# Patient Record
Sex: MALE | Race: WHITE | NOT HISPANIC OR LATINO | Employment: OTHER | URBAN - METROPOLITAN AREA
[De-identification: names, ages, dates, MRNs, and addresses within clinical notes are randomized per-mention and may not be internally consistent; named-entity substitution may affect disease eponyms.]

---

## 2021-10-21 ENCOUNTER — HOSPITAL ENCOUNTER (INPATIENT)
Facility: CLINIC | Age: 81
LOS: 7 days | Discharge: HOME HEALTH | DRG: 726 | End: 2021-10-29
Attending: EMERGENCY MEDICINE | Admitting: HOSPITALIST
Payer: MEDICARE

## 2021-10-21 DIAGNOSIS — G93.41 ACUTE METABOLIC ENCEPHALOPATHY: Primary | ICD-10-CM

## 2021-10-21 DIAGNOSIS — R31.0 GROSS HEMATURIA: ICD-10-CM

## 2021-10-21 DIAGNOSIS — M62.81 GENERALIZED MUSCLE WEAKNESS: ICD-10-CM

## 2021-10-21 LAB
ALBUMIN SERPL-MCNC: 3.9 G/DL (ref 3.4–5)
ALBUMIN UR-MCNC: 200 MG/DL
ALP SERPL-CCNC: 74 U/L (ref 40–150)
ALT SERPL W P-5'-P-CCNC: 12 U/L (ref 0–70)
ANION GAP SERPL CALCULATED.3IONS-SCNC: 9 MMOL/L (ref 3–14)
APPEARANCE UR: ABNORMAL
AST SERPL W P-5'-P-CCNC: 10 U/L (ref 0–45)
BASOPHILS # BLD AUTO: 0 10E3/UL (ref 0–0.2)
BASOPHILS NFR BLD AUTO: 0 %
BILIRUB SERPL-MCNC: 0.6 MG/DL (ref 0.2–1.3)
BILIRUB UR QL STRIP: NEGATIVE
BUN SERPL-MCNC: 19 MG/DL (ref 7–30)
CALCIUM SERPL-MCNC: 8.9 MG/DL (ref 8.5–10.1)
CHLORIDE BLD-SCNC: 104 MMOL/L (ref 94–109)
CO2 SERPL-SCNC: 24 MMOL/L (ref 20–32)
COLOR UR AUTO: ABNORMAL
CREAT SERPL-MCNC: 0.86 MG/DL (ref 0.66–1.25)
EOSINOPHIL # BLD AUTO: 0.1 10E3/UL (ref 0–0.7)
EOSINOPHIL NFR BLD AUTO: 2 %
ERYTHROCYTE [DISTWIDTH] IN BLOOD BY AUTOMATED COUNT: 15.8 % (ref 10–15)
GFR SERPL CREATININE-BSD FRML MDRD: 80 ML/MIN/1.73M2
GLUCOSE BLD-MCNC: 199 MG/DL (ref 70–99)
GLUCOSE UR STRIP-MCNC: 500 MG/DL
HCT VFR BLD AUTO: 34.5 % (ref 40–53)
HGB BLD-MCNC: 11 G/DL (ref 13.3–17.7)
HGB UR QL STRIP: ABNORMAL
IMM GRANULOCYTES # BLD: 0 10E3/UL
IMM GRANULOCYTES NFR BLD: 0 %
KETONES UR STRIP-MCNC: NEGATIVE MG/DL
LEUKOCYTE ESTERASE UR QL STRIP: NEGATIVE
LYMPHOCYTES # BLD AUTO: 1.9 10E3/UL (ref 0.8–5.3)
LYMPHOCYTES NFR BLD AUTO: 26 %
MCH RBC QN AUTO: 26.3 PG (ref 26.5–33)
MCHC RBC AUTO-ENTMCNC: 31.9 G/DL (ref 31.5–36.5)
MCV RBC AUTO: 82 FL (ref 78–100)
MONOCYTES # BLD AUTO: 0.4 10E3/UL (ref 0–1.3)
MONOCYTES NFR BLD AUTO: 6 %
NEUTROPHILS # BLD AUTO: 4.7 10E3/UL (ref 1.6–8.3)
NEUTROPHILS NFR BLD AUTO: 66 %
NITRATE UR QL: NEGATIVE
NRBC # BLD AUTO: 0 10E3/UL
NRBC BLD AUTO-RTO: 0 /100
PH UR STRIP: 6.5 [PH] (ref 5–7)
PLATELET # BLD AUTO: 233 10E3/UL (ref 150–450)
POTASSIUM BLD-SCNC: 3.6 MMOL/L (ref 3.4–5.3)
PROT SERPL-MCNC: 7.4 G/DL (ref 6.8–8.8)
RBC # BLD AUTO: 4.19 10E6/UL (ref 4.4–5.9)
RBC URINE: >182 /HPF
SARS-COV-2 RNA RESP QL NAA+PROBE: NEGATIVE
SODIUM SERPL-SCNC: 137 MMOL/L (ref 133–144)
SP GR UR STRIP: 1.03 (ref 1–1.03)
UROBILINOGEN UR STRIP-MCNC: NORMAL MG/DL
WBC # BLD AUTO: 7.2 10E3/UL (ref 4–11)
WBC URINE: 57 /HPF

## 2021-10-21 PROCEDURE — 84295 ASSAY OF SERUM SODIUM: CPT | Performed by: EMERGENCY MEDICINE

## 2021-10-21 PROCEDURE — 80053 COMPREHEN METABOLIC PANEL: CPT | Performed by: EMERGENCY MEDICINE

## 2021-10-21 PROCEDURE — 99285 EMERGENCY DEPT VISIT HI MDM: CPT | Mod: 25

## 2021-10-21 PROCEDURE — G0378 HOSPITAL OBSERVATION PER HR: HCPCS

## 2021-10-21 PROCEDURE — 250N000013 HC RX MED GY IP 250 OP 250 PS 637: Performed by: HOSPITALIST

## 2021-10-21 PROCEDURE — 87635 SARS-COV-2 COVID-19 AMP PRB: CPT | Performed by: EMERGENCY MEDICINE

## 2021-10-21 PROCEDURE — 82374 ASSAY BLOOD CARBON DIOXIDE: CPT | Performed by: EMERGENCY MEDICINE

## 2021-10-21 PROCEDURE — 250N000013 HC RX MED GY IP 250 OP 250 PS 637: Performed by: EMERGENCY MEDICINE

## 2021-10-21 PROCEDURE — 85025 COMPLETE CBC W/AUTO DIFF WBC: CPT | Performed by: EMERGENCY MEDICINE

## 2021-10-21 PROCEDURE — 99220 PR INITIAL OBSERVATION CARE,LEVEL III: CPT | Performed by: HOSPITALIST

## 2021-10-21 PROCEDURE — 36415 COLL VENOUS BLD VENIPUNCTURE: CPT | Performed by: EMERGENCY MEDICINE

## 2021-10-21 PROCEDURE — 81001 URINALYSIS AUTO W/SCOPE: CPT | Performed by: EMERGENCY MEDICINE

## 2021-10-21 PROCEDURE — C9803 HOPD COVID-19 SPEC COLLECT: HCPCS

## 2021-10-21 PROCEDURE — 87086 URINE CULTURE/COLONY COUNT: CPT | Performed by: EMERGENCY MEDICINE

## 2021-10-21 RX ORDER — ONDANSETRON 2 MG/ML
4 INJECTION INTRAMUSCULAR; INTRAVENOUS EVERY 6 HOURS PRN
Status: DISCONTINUED | OUTPATIENT
Start: 2021-10-21 | End: 2021-10-29 | Stop reason: HOSPADM

## 2021-10-21 RX ORDER — CARBIDOPA AND LEVODOPA 25; 100 MG/1; MG/1
1 TABLET ORAL ONCE
Status: COMPLETED | OUTPATIENT
Start: 2021-10-21 | End: 2021-10-21

## 2021-10-21 RX ORDER — DOCUSATE SODIUM 100 MG/1
100 CAPSULE, LIQUID FILLED ORAL DAILY PRN
Status: DISCONTINUED | OUTPATIENT
Start: 2021-10-21 | End: 2021-10-29 | Stop reason: HOSPADM

## 2021-10-21 RX ORDER — AMOXICILLIN 250 MG
2 CAPSULE ORAL 2 TIMES DAILY PRN
Status: DISCONTINUED | OUTPATIENT
Start: 2021-10-21 | End: 2021-10-29 | Stop reason: HOSPADM

## 2021-10-21 RX ORDER — CARBIDOPA AND LEVODOPA 25; 100 MG/1; MG/1
1 TABLET ORAL 3 TIMES DAILY
COMMUNITY

## 2021-10-21 RX ORDER — AMOXICILLIN 250 MG
1 CAPSULE ORAL 2 TIMES DAILY PRN
Status: DISCONTINUED | OUTPATIENT
Start: 2021-10-21 | End: 2021-10-29 | Stop reason: HOSPADM

## 2021-10-21 RX ORDER — QUETIAPINE FUMARATE 25 MG/1
25 TABLET, FILM COATED ORAL 2 TIMES DAILY
Status: DISCONTINUED | OUTPATIENT
Start: 2021-10-21 | End: 2021-10-24

## 2021-10-21 RX ORDER — OLANZAPINE 5 MG/1
5 TABLET, ORALLY DISINTEGRATING ORAL ONCE
Status: COMPLETED | OUTPATIENT
Start: 2021-10-21 | End: 2021-10-21

## 2021-10-21 RX ORDER — LANOLIN ALCOHOL/MO/W.PET/CERES
1 CREAM (GRAM) TOPICAL
COMMUNITY

## 2021-10-21 RX ORDER — ONDANSETRON 4 MG/1
4 TABLET, ORALLY DISINTEGRATING ORAL EVERY 6 HOURS PRN
Status: DISCONTINUED | OUTPATIENT
Start: 2021-10-21 | End: 2021-10-29 | Stop reason: HOSPADM

## 2021-10-21 RX ORDER — LISINOPRIL 20 MG/1
20 TABLET ORAL DAILY
COMMUNITY
End: 2021-10-21

## 2021-10-21 RX ORDER — CARBIDOPA AND LEVODOPA 25; 100 MG/1; MG/1
1 TABLET ORAL 3 TIMES DAILY
Status: DISCONTINUED | OUTPATIENT
Start: 2021-10-22 | End: 2021-10-29 | Stop reason: HOSPADM

## 2021-10-21 RX ORDER — LANOLIN ALCOHOL/MO/W.PET/CERES
3 CREAM (GRAM) TOPICAL
Status: DISCONTINUED | OUTPATIENT
Start: 2021-10-21 | End: 2021-10-29 | Stop reason: HOSPADM

## 2021-10-21 RX ORDER — DOCUSATE SODIUM 100 MG/1
100 CAPSULE, LIQUID FILLED ORAL DAILY PRN
COMMUNITY

## 2021-10-21 RX ADMIN — OLANZAPINE 5 MG: 5 TABLET, ORALLY DISINTEGRATING ORAL at 19:05

## 2021-10-21 RX ADMIN — CARBIDOPA AND LEVODOPA 1 TABLET: 25; 100 TABLET ORAL at 19:05

## 2021-10-21 RX ADMIN — QUETIAPINE FUMARATE 25 MG: 25 TABLET ORAL at 19:13

## 2021-10-21 RX ADMIN — MELATONIN TAB 3 MG 3 MG: 3 TAB at 21:09

## 2021-10-21 ASSESSMENT — ENCOUNTER SYMPTOMS
WEAKNESS: 1
HEMATURIA: 1
CONSTIPATION: 0
APPETITE CHANGE: 1

## 2021-10-21 NOTE — PHARMACY-ADMISSION MEDICATION HISTORY
Pharmacy Medication History  Admission medication history interview status for the 10/21/2021 admission is complete. See EPIC admission navigator for prior to admission medications.    Location of Interview: Patient room  Medication history sources: Patient's family/friend (wife) and Surescripts    Significant changes made to the medication list:  Removed: lisinopril and magnesium  Added: melatonin, docusate and supplement    In the past week, patient estimated taking medication this percent of the time: greater than 90%    Additional medication history information:   None    Medication reconciliation completed by provider prior to medication history? Yes    Time spent in this activity: 15 minutes    Prior to Admission medications    Medication Sig Last Dose Taking? Auth Provider   carbidopa-levodopa (SINEMET)  MG tablet Take 1 tablet by mouth 3 times daily At 11 am, 3 pm, 7 pm 10/21/2021 at 4 PM Yes Unknown, Entered By History   docusate sodium (COLACE) 100 MG capsule Take 100 mg by mouth daily as needed for constipation Past Week at Unknown time Yes Unknown, Entered By History   melatonin 3 MG tablet Take 1 mg by mouth nightly as needed for sleep 10/20/2021 at Unknown time Yes Unknown, Entered By History   UNABLE TO FIND MEDICATION NAME: Neutracuticals for brain and immune system health 10/21/2021 at Unknown time Yes Unknown, Entered By History   Vitamin D3 (CHOLECALCIFEROL) 125 MCG (5000 UT) tablet Take 1 tablet by mouth daily 10/21/2021 at Unknown time Yes Unknown, Entered By History       The information provided in this note is only as accurate as the sources available at the time of update(s)

## 2021-10-21 NOTE — H&P
[facility]    History and Physical - Hospitalist Service       Date of Admission:  10/21/2021    Assessment & Plan      Bismark Marrero is a 81 year old male with HTN, history of prostate ca, chronic recurrent hematuria, parkinson's disease with dementia admitted on 10/21/2021 for gross hematuria.    Magdiel hematuria with clots  Acute blood loss anemia, mild  Hgb relatively stable from previous. Hemodynamically stable. Hematuria started on Sunday but patient passed a large clot with a large amount of urine this morning.  - admit to observation  - await recommendations from urology - they have been paged by ED. Tentative plan is cystoscopy tomorrow. They will need to respond more urgently if patient is unable to pass urine due to clots. Per wife he passed a large clot this morning and c/o pain with passage of clots.  - check Hgb Q6h for now  - hold any anti coagulation and/or anti hypertensive given bleeding  - consider starting IV Ceftriaxone for any evidence of infection. UA showing blood.    Urinary incontinence: wears depends at night    Parkinson's disease  Dementia with behavioral disturbance: on PTA sinemet. There was some question of Lewy body dementia. No current follow up with neurology as patient's wife did not like follow up at [facility]. She gives sinemet intermittently and doesn't notice change with giving or not giving medication. Patient has been sundowning at home and wife provides all cares with assistance from son who lives nearby.  - continue sinemet TID for now  - start seroquel BID and patient will likely need sitter if family not present    Generalized weakness  Bedbound  Malnutrition  Severely debilitated since hospitalization with COVID in April. He spent 3 months in a TCU/LTC facility but wife said she brought him home because he was put on the dementia unit and she felt care would be better at home. Since he has come home he has had progressing dementia and  "behavioral issues including sundowning. Wife has not talked to anyone about these issues and had many questions about what is \"normal.\" She does seem somewhat overwhelmed with cares. Patient needs assistance to ambulate, get up from chair. He spends most time sitting in a chair. Very agitated and confused in the ED. We did discuss medication for delirium and sundowning and patient's wife was okay with trying something.  - zyprexa 5mg now. Patient will need sitter for wife to leave. Discussed with ED provider.  - seroquel 25mg po BID scheduled - hold for sedation, or reduce am dose if patient is sedated tonight.  - PT/OT  - nutrition consult  - fall precautions  - care mgmt/SW as it sounds like home situation is becoming difficult and family needs additional care. Wife mentioned that she hired help at one point but patient has some loss of inhibition with dementia and made sexual comments that are \"not him at all\" and the person quit after second visit.  - consider neurology consult or patient will at least need follow up appointment in the near future    COVID 19 PCR screen pending. Recovered COVID 4/21.     Diet:   Regular  DVT Prophylaxis: Pneumatic Compression Devices  Brar Catheter: Not present  Central Lines: None  Code Status:   Full code - patient is not able to make this decision. Wife spoke with son over phone as we discussed this and they are adamantly full code.    Clinically Significant Risk Factors Present on Admission                   Disposition Plan   Expected discharge: TBD based on urology plan recommended to prior living arrangement vs higher level of care once hemoglobin stable and urology evaluation and possible treatment complete.     The patient's care was discussed with the Attending Physician, Dr. Jurado and Dr López in ED, Bedside Nurse, Patient and Patient's Family.    Joaquina Garner MD  [facility]  Securely message with the Vocera Web Console (learn " more here)  Text page via [facility] Paging/Directory      ______________________________________________________________________    Chief Complaint   Hematuria    History is obtained from the patient's wife    History of Present Illness   Bismark Marrero is a 81 year old male who has a history of hematuria. Records are unfortunately not available regarding previous episodes. Hematuria started on Sunday but today patient was passing large clots and complaining of pain so wife brought him to ED. Patient has been living at home with wife. Functional status has declined significantly in the last 6 months and it sounds like family will need more assistance than they currently have.    Review of Systems    The 10 point Review of Systems is negative other than noted in the HPI or here.     Past Medical History    I have reviewed this patient's medical history and updated it with pertinent information if needed.     Gangrene (HCC)     HTN (hypertension)     Parkinson disease (HCC)     Past Surgical History   I have reviewed this patient's surgical history and updated it with pertinent information if needed.  FOOT/TOES SURGERY AMPUTATION     Social History   I have reviewed this patient's social history and updated it with pertinent information if needed.  Former smoker. ,    Family History     Father: lung cancer and hypertension   Mother: hypertension and heart diease   Sister: hypertension       Prior to Admission Medications   Prior to Admission Medications   Prescriptions Last Dose Informant Patient Reported? Taking?   Vitamin D3 (CHOLECALCIFEROL) 125 MCG (5000 UT) tablet   Yes Yes   Sig: Take 1 tablet by mouth daily   carbidopa-levodopa (SINEMET)  MG tablet   Yes Yes   Sig: Take 1 tablet by mouth 3 times daily   lisinopril (ZESTRIL) 20 MG tablet   Yes Yes   Sig: Take 20 mg by mouth daily   magnesium oxide 200 MG TABS   Yes Yes   Sig: Take 400 mg by mouth daily      Facility-Administered Medications: None      Allergies   No Known Allergies    Physical Exam   Vital Signs: Temp: 97.8  F (36.6  C) Temp src: Oral BP: (!) 159/71 Pulse: 94   Resp: 18 SpO2: 98 %      Weight: 0 lbs 0 oz        Data   Data reviewed today: I reviewed all medications, new labs and imaging results over the last 24 hours. I personally reviewed no images or EKG's today.    Recent Labs   Lab 10/21/21  1410   WBC 7.2   HGB 11.0*   MCV 82         POTASSIUM 3.6   CHLORIDE 104   CO2 24   BUN 19   CR 0.86   ANIONGAP 9   UBALDO 8.9   *   ALBUMIN 3.9   PROTTOTAL 7.4   BILITOTAL 0.6   ALKPHOS 74   ALT 12   AST 10     No results found for this or any previous visit (from the past 24 hour(s)).

## 2021-10-21 NOTE — ED TRIAGE NOTES
Augustin of covid in April.  Presents today complaining of blood in his urine and bleeding from the penis.  Also has become progressively week over the past few days.

## 2021-10-21 NOTE — ED PROVIDER NOTES
History   Chief Complaint:  Hematuria and Generalized Weakness    The history is provided by the patient and the spouse.      Bismark Marrero is a 81 year old male with history of Parkinson's disease, prostate cancer, Schwannoma, and COVID-19 who presents with hematuria and generalized weakness. The patient's wife reports that for the past five days, he has been bleeding through his urethra. This has happened before, most recently two years ago, at which time he was told it was due to an enlarged prostate with a blood vessel that should be cauterized. He comes to the ED because this morning he bled so much that it soaked through his Depends. Wife says that he is normally constipated, but had a bowel movement today. Has not been drinking much fluids lately.     Review of Systems   Constitutional: Positive for appetite change (decreased fluids).   Gastrointestinal: Negative for constipation.   Genitourinary: Positive for hematuria.   Neurological: Positive for weakness (generalized).     10 systems reviewed and negative except as above and in HPI.    Allergies:  Naproxen     Medications:  Sinemet  Zestril  Magnesium oxide    Past Medical History:    Delirium  Ulcer of toe of right foot  Cellulitis of right foot  Acute encephalopathy  DJD  Peripheral vascular disease  Parkinson's disease  Thoracic kyphosis  Prostate cancer   Hypertension  Schwannoma  Tremor  Tachycardia  Osteoarthritis of lumbar spine  GERD  Onychomycosis  Hearing loss  Right inguinal hernia   Acute urinary retention  COVID-19   Gangrene     Past Surgical History:    Right toe amputation   Prostate biopsies  Tonsillectomy  Cystoscopy  Colonoscopy  Scan-bone densitometry DEXA     Family History:    Brother: bladder cancer, non-hodgkin's lymphoma  Father: lung cancer, hypertension, pneumonia  Mother: CHF, hypertension  Sister: hypertension    Social History:  The patient presents to the ED with his wife.     Physical Exam     Patient Vitals for the past  24 hrs:   BP Temp Temp src Pulse Resp SpO2   10/21/21 1403 (!) 159/71 97.8  F (36.6  C) Oral 94 18 98 %       Physical Exam  General: Resting on the gurney, appears uncomfortable  Head:  The scalp, face, and head appear normal  Mouth/Throat: Mucus membranes are moist  CV:  Regular rate    Normal S1 and S2  No pathological murmur   Resp:  Breath sounds clear and equal bilaterally    Non-labored, no retractions or accessory muscle use    No coarseness    No wheezing   GI:  Abdomen is soft and non-tender. No guarding. No rebound.   :  No active bleeding at the urethral meatus.   MS:  Normal motor assessment of all extremities.    Good capillary refill noted.  Skin:  No rash or lesions noted. Slightly pale   Neuro:   Speech is normal and fluent. No apparent deficit.  Psych: Awake. Alert.  Normal affect.      Appropriate interactions.    Emergency Department Course     Laboratory:  CBC: WBC 7.2, HGB 11.0(L),      CMP: glucose: 199(H) o/w WNL (Creatinine 0.86)     UA with microscopic: color: red, appearance: cloudy, glucose: 500, blood: large, protein albumin: 200, RBC: >182(H), WBC: 57(H) o/w WNL    Asymptomatic COVID19 Virus PCR by nasopharyngeal swab: PENDING     Urine Culture: PENDING    Emergency Department Course:    Reviewed:  I reviewed nursing notes, vitals, past medical history and care everywhere    Assessments:  1655 I obtained history and examined the patient as noted above.   1814 I rechecked the patient and explained findings.     Consults:   1810 I spoke with Dr. Garner, hospitalist, regarding the patient, who agreed to admit the patient.   1817 I spoke with Dr. Porter, urology, regarding the patient.     Disposition:  The patient was admitted to the hospital under the care of Dr. Garner.     Impression & Plan     Medical Decision Making:  Bismark Marrero is a 81 year old male presents with grossly bloody urine. This became severe today.  There is no pain with these symptoms and he has  had these  symptoms before, most recently two years ago. History of prostate cancer.  Workup here is significant for ongoing gross hematuria.  There are no signs of UTI.   Consulted with urology who recommended admission with likely scope tomorrow.  They would like his hemoglobin trended.  As long as he is able to urinate stated a catheter does not need to be placed, however, should he have difficulty with urination a three-way catheter for continuous irrigation should be placed. Will admit to hositalist.     Covid-19  Bismark Marrero was evaluated during a global COVID-19 pandemic, which necessitated consideration that the patient might be at risk for infection with the SARS-CoV-2 virus that causes COVID-19.   Applicable protocols for evaluation were followed during the patient's care.   COVID-19 was considered as part of the patient's evaluation. The plan for testing is:  a test was obtained during this visit.    Diagnosis:    ICD-10-CM    1. Gross hematuria  R31.0      Scribe Disclosure:  I, Tex Walls, am serving as a scribe at 4:41 PM on 10/21/2021 to document services personally performed by Asiya Jurado MD based on my observations and the provider's statements to me.            Asiya Jurado MD  10/21/21 [id number]

## 2021-10-22 ENCOUNTER — APPOINTMENT (OUTPATIENT)
Dept: CT IMAGING | Facility: CLINIC | Age: 81
DRG: 726 | End: 2021-10-22
Attending: PHYSICIAN ASSISTANT
Payer: MEDICARE

## 2021-10-22 LAB
ABO/RH(D): NORMAL
ANION GAP SERPL CALCULATED.3IONS-SCNC: 5 MMOL/L (ref 3–14)
ANTIBODY SCREEN: NEGATIVE
BACTERIA UR CULT: NORMAL
BUN SERPL-MCNC: 18 MG/DL (ref 7–30)
CALCIUM SERPL-MCNC: 8.8 MG/DL (ref 8.5–10.1)
CHLORIDE BLD-SCNC: 108 MMOL/L (ref 94–109)
CO2 SERPL-SCNC: 26 MMOL/L (ref 20–32)
CREAT SERPL-MCNC: 0.91 MG/DL (ref 0.66–1.25)
ERYTHROCYTE [DISTWIDTH] IN BLOOD BY AUTOMATED COUNT: 15.8 % (ref 10–15)
GFR SERPL CREATININE-BSD FRML MDRD: 78 ML/MIN/1.73M2
GLUCOSE BLD-MCNC: 94 MG/DL (ref 70–99)
HCT VFR BLD AUTO: 28.5 % (ref 40–53)
HGB BLD-MCNC: 8.9 G/DL (ref 13.3–17.7)
HGB BLD-MCNC: 9.1 G/DL (ref 13.3–17.7)
HGB BLD-MCNC: 9.1 G/DL (ref 13.3–17.7)
HGB BLD-MCNC: 9.9 G/DL (ref 13.3–17.7)
INR PPP: 1.11 (ref 0.85–1.15)
MCH RBC QN AUTO: 25.8 PG (ref 26.5–33)
MCHC RBC AUTO-ENTMCNC: 31.9 G/DL (ref 31.5–36.5)
MCV RBC AUTO: 81 FL (ref 78–100)
PLATELET # BLD AUTO: 186 10E3/UL (ref 150–450)
POTASSIUM BLD-SCNC: 3.8 MMOL/L (ref 3.4–5.3)
RBC # BLD AUTO: 3.53 10E6/UL (ref 4.4–5.9)
SODIUM SERPL-SCNC: 139 MMOL/L (ref 133–144)
SPECIMEN EXPIRATION DATE: NORMAL
WBC # BLD AUTO: 6.7 10E3/UL (ref 4–11)

## 2021-10-22 PROCEDURE — 120N000004 HC R&B MS OVERFLOW

## 2021-10-22 PROCEDURE — 85610 PROTHROMBIN TIME: CPT | Performed by: HOSPITALIST

## 2021-10-22 PROCEDURE — 99233 SBSQ HOSP IP/OBS HIGH 50: CPT | Performed by: INTERNAL MEDICINE

## 2021-10-22 PROCEDURE — 85018 HEMOGLOBIN: CPT | Performed by: INTERNAL MEDICINE

## 2021-10-22 PROCEDURE — 0T9B70Z DRAINAGE OF BLADDER WITH DRAINAGE DEVICE, VIA NATURAL OR ARTIFICIAL OPENING: ICD-10-PCS | Performed by: STUDENT IN AN ORGANIZED HEALTH CARE EDUCATION/TRAINING PROGRAM

## 2021-10-22 PROCEDURE — G0378 HOSPITAL OBSERVATION PER HR: HCPCS

## 2021-10-22 PROCEDURE — 85018 HEMOGLOBIN: CPT | Performed by: HOSPITALIST

## 2021-10-22 PROCEDURE — 85027 COMPLETE CBC AUTOMATED: CPT | Performed by: HOSPITALIST

## 2021-10-22 PROCEDURE — 80048 BASIC METABOLIC PNL TOTAL CA: CPT | Performed by: HOSPITALIST

## 2021-10-22 PROCEDURE — 86900 BLOOD TYPING SEROLOGIC ABO: CPT | Performed by: INTERNAL MEDICINE

## 2021-10-22 PROCEDURE — 250N000013 HC RX MED GY IP 250 OP 250 PS 637: Performed by: HOSPITALIST

## 2021-10-22 PROCEDURE — 36415 COLL VENOUS BLD VENIPUNCTURE: CPT | Performed by: HOSPITALIST

## 2021-10-22 PROCEDURE — 74176 CT ABD & PELVIS W/O CONTRAST: CPT | Mod: ME

## 2021-10-22 PROCEDURE — 36415 COLL VENOUS BLD VENIPUNCTURE: CPT | Performed by: INTERNAL MEDICINE

## 2021-10-22 RX ADMIN — QUETIAPINE FUMARATE 25 MG: 25 TABLET ORAL at 19:53

## 2021-10-22 RX ADMIN — CARBIDOPA AND LEVODOPA 1 TABLET: 25; 100 TABLET ORAL at 19:53

## 2021-10-22 RX ADMIN — CARBIDOPA AND LEVODOPA 1 TABLET: 25; 100 TABLET ORAL at 12:49

## 2021-10-22 RX ADMIN — QUETIAPINE FUMARATE 25 MG: 25 TABLET ORAL at 08:41

## 2021-10-22 RX ADMIN — CARBIDOPA AND LEVODOPA 1 TABLET: 25; 100 TABLET ORAL at 16:52

## 2021-10-22 ASSESSMENT — ACTIVITIES OF DAILY LIVING (ADL)
ADLS_ACUITY_SCORE: 16

## 2021-10-22 NOTE — UTILIZATION REVIEW
Admission Status; Secondary Review Determination         Under the authority of the Utilization Management Committee, the utilization review process indicated a secondary review on the above patient.  The review outcome is based on review of the medical records, discussions with staff, and applying clinical experience noted on the date of the review.        (x)      Inpatient Status Appropriate - This patient's medical care is consistent with medical management for inpatient care and reasonable inpatient medical practice.      RATIONALE FOR DETERMINATION   The patient is a 81-year-old male admitted on 10/21/2021.  He came to the emergency room to evaluate gross hematuria.  Hematuria began 5 days prior to admission.  His wife noted small clots in his depends.  Hemoglobin is gone from 11.0-9.1 since admission 24 hours prior.  CT of his pelvis and abdomen shows a dense area in his bladder along with calcification consistent with an organized blood clot.  He does have an elevated PSA.  Urology was consulted and is recommending continuous bladder irrigation and likely need for cystoscopy tomorrow and possible clot evacuation and possible fulguration.  Cystoscopy will also assist with evaluation of other possible underlying urologic conditions causing bleeding.  Based on dropping hemoglobin and need for acute diagnostic management along with treatment, inpatient status is appropriate.  Discussed with Dr. Patel who will change him to inpatient status.    The severity of illness, intensity of service provided, expected LOS and risk for adverse outcome make the care complex, high risk and appropriate for hospital admission.        The information on this document is developed by the utilization review team in order for the business office to ensure compliance.  This only denotes the appropriateness of proper admission status and does not reflect the quality of care rendered.         The definitions of Inpatient Status and  Observation Status used in making the determination above are those provided in the CMS Coverage Manual, Chapter 1 and Chapter 6, section 70.4.      Sincerely,     Ross Landis MD  Physician Advisor  Utilization Review/ Case Management  [facility].

## 2021-10-22 NOTE — PROGRESS NOTES
.Observation goals PRIOR TO DISCHARGE     -diagnostic tests and consults completed and resulted not met  -vital signs normal or at patient baseline Partially met. Still sit  -safe disposition plan has been identified not met

## 2021-10-22 NOTE — PROGRESS NOTES
[facility]    HOSPITALIST PROGRESS NOTE :   --------------------------------------------------    Date of Admission:  10/21/2021    Cumulative Summary: Bismark Marrero is a 81 year old male with HTN, history of prostate ca, chronic recurrent hematuria, parkinson's disease with dementia admitted on 10/21/2021 for gross hematuria.    Assessment & Plan     Magdiel hematuria with clots  Acute blood loss anemia, mild  Hgb relatively stable from previous. Hemodynamically stable. Hematuria started on Sunday but patient passed a large clot with a large amount of urine on the morning of admission .  Patient wad admitted for further evaluation by urology   Patient primary urologist is Dr. Saleem, last seen in 2019.  Cystoscopy at that time demonstrated large median lobe of prostate without any bladder tumors.  Patient has undergone TURP in 2016 with 41 g resected.  He has been on finasteride in the past which was discontinued sometime ago by his wife who was concerned that it was contributing to his neurological decline.  Clinic note from January 2016 with Dr. Maza notes that patient had multiple prostate biopsies in the past with Dr. Wen, but currently urology is unable to find any documentation.  PSA was up to 8.68 in October 2016.    -- Patient care was assumed this morning , seen and examined, sleepy but woke up, poor historian sec to underlying dementia   -- Hgb is checked every 6 hours , will change it to every 12 hours as it has been stable   -- will try to get the blood consent from the wife over the phone in case if patient needs transfusion , will order the conditional unit of PRBC to be transfused when Hgb is less than 7   -- Of note patient is not on any home AC and is not receiving any chemical DVT prophylaxis at this time ,as urology is recommending to hold AC  - urology recommendations are reviewed , no plan for OR today , tentatively tomorrow if continues to have symptoms   -- CT scan of  "the abdomen and pelvis is done this morning , results are reviewed , showing Hyperdense material in the urinary bladder lumen is likely clot.Curvilinear calcification in the bladder neck is either in the  urinary bladder wall or in the median lobe of the prostate gland  Consider cystoscopy to exclude underlying lesions. Marked prostatomegaly.  -- Currently patient is not on any antibiotics   -- Consider starting IV Ceftriaxone for any evidence of infection. UA showing blood.     Urinary incontinence:   -- wears depends at night  -- as above , might need ibarra if retaining , per bedside nursing , he only has 75 ml residue in the bladder.     Parkinson's disease  Dementia with behavioral disturbance: on PTA sinemet. There was some question of Lewy body dementia. No current follow up with neurology as patient's wife did not like follow up at [facility]. She gives sinemet intermittently and doesn't notice change with giving or not giving medication. Patient has been sundowning at home and wife provides all cares with assistance from son who lives nearby.    -- continue sinemet TID for now  -- start seroquel BID and patient will likely need sitter if family not present  -- will also place consult for neurology who can undergo medication dosage and administration instructions with wife      Generalized weakness  Bedbound  Malnutrition  Severely debilitated since hospitalization with COVID in April. He spent 3 months in a TCU/LTC facility but wife said she brought him home because he was put on the dementia unit and she felt care would be better at home. Since he has come home he has had progressing dementia and behavioral issues including sundowning. Wife has not talked to anyone about these issues and had many questions about what is \"normal.\" She does seem somewhat overwhelmed with cares. Patient needs assistance to ambulate, get up from chair. He spends most time sitting in a chair. Very agitated and confused in the " "ED. We did discuss medication for delirium and sundowning and patient's wife was okay with trying something.    - zyprexa 5mg now. Patient will need sitter for wife to leave. Discussed with ED provider.  - seroquel 25mg po BID scheduled - hold for sedation, or reduce am dose if patient is sedated tonight.  - PT/OT  - nutrition consult  - fall precautions  - care mgmt/SW as it sounds like home situation is becoming difficult and family needs additional care. Wife mentioned that she hired help at one point but patient has some loss of inhibition with dementia and made sexual comments that are \"not him at all\" and the person quit after second visit.Needs discussion regarding plan for discharge with wife if she would prefer to take him home again once he is ready to leave   -- Neurology is consulted      COVID 19 PCR came back negative . Recovered COVID 4/21.    Clinically Significant Risk Factors Present on Admission                Diet: Regular Diet Adult    Brar Catheter: Not present  DVT Prophylaxis: Pneumatic Compression Devices, AC on hold   Code Status: Full Code    The patient's care was discussed with the Bedside Nurse and Patient.    Disposition Plan   Expected discharge:will follow up on PT and OT recommendations , currently patient is living at home with his wife but there are concerns for safe disposition planning.About 37 minutes of the time was spent on floir in patient care , more than 60 % of the time was spent in counseling and coordination of the care   Entered: Almita Starkey MD 10/22/2021, 11:03 AM     Almita Starkey MD, FACP  Text Page (7am - 6pm)    ----------------------------------------------------------------------------------------------------------------------      Interval History   Patient care was assumed this morning , seen and examined, comfortable , lying in bed , slightly sleepy but woke up easily , unable to provide any history, comfortable and denying ay any pain at this point "     -Data reviewed today: I reviewed all new labs and imaging results over the last 24 hours.    I personally reviewed no images or EKG's today.    Physical Exam   Temp: 97.5  F (36.4  C) Temp src: Axillary BP: 100/43 Pulse: 80   Resp: 18 SpO2: 100 % O2 Device: None (Room air)    Vitals:    10/22/21 0317   Weight: 76.2 kg (168 lb)     Vital Signs with Ranges  Temp:  [97.5  F (36.4  C)-97.8  F (36.6  C)] 97.5  F (36.4  C)  Pulse:  [77-94] 80  Resp:  [18] 18  BP: (100-159)/() 100/43  SpO2:  [97 %-100 %] 100 %  No intake/output data recorded.    GENERAL: Alert , awake and oriented. NAD. Conversational, appropriate.   HEENT: Normocephalic. EOMI. No icterus or injection. Nares normal.   LUNGS: Clear to auscultation. No dyspnea at rest.   HEART: Regular rate. Extremities perfused.   ABDOMEN: Soft, nontender, and nondistended. Positive bowel sounds.   EXTREMITIES: No LE edema noted.   NEUROLOGIC: Moves extremities x4 on command. No acute focal neurologic abnormalities noted.     Medications       carbidopa-levodopa  1 tablet Oral TID     QUEtiapine  25 mg Oral BID       Data   Recent Labs   Lab 10/22/21  0659 10/22/21  0009 10/21/21  1410   WBC 6.7  --  7.2   HGB 9.1*  9.1* 8.9* 11.0*   MCV 81  --  82     --  233   INR 1.11  --   --      --  137   POTASSIUM 3.8  --  3.6   CHLORIDE 108  --  104   CO2 26  --  24   BUN 18  --  19   CR 0.91  --  0.86   ANIONGAP 5  --  9   UBALDO 8.8  --  8.9   GLC 94  --  199*   ALBUMIN  --   --  3.9   PROTTOTAL  --   --  7.4   BILITOTAL  --   --  0.6   ALKPHOS  --   --  74   ALT  --   --  12   AST  --   --  10       Imaging:   Recent Results (from the past 24 hour(s))   CT Abdomen Pelvis w/o Contrast    Narrative    CT ABDOMEN PELVIS W/O CONTRAST 10/22/2021 9:20 AM    CLINICAL HISTORY: Hematuria, unknown cause  TECHNIQUE: CT scan of the abdomen and pelvis was performed without IV  contrast. Multiplanar reformats were obtained. Dose reduction  techniques were used.  CONTRAST:  None.    COMPARISON: None.    FINDINGS:   LOWER CHEST: Mild bibasilar atelectasis/scarring. Small hiatal hernia.    HEPATOBILIARY: Normal.    PANCREAS: Fatty atrophy of the pancreatic parenchyma in the head and  body.    SPLEEN: Few punctate calcified granulomas.    ADRENAL GLANDS: Normal.    KIDNEYS/BLADDER: No urinary system calculi, hydronephrosis or  perinephritic stranding. No renal masses evident on this noncontrast  CT. Hyperdense material within the urinary bladder lumen is  indeterminate (series 3, image 152), likely clot within the urinary  bladder. Curvilinear calcification in the bladder neck measuring about  3.0 x 0.9 cm (series 3, image 156), either in the urinary bladder wall  or median lobe of the prostate gland.    BOWEL: Normal caliber loops of small bowel and colon. Umbilical hernia  containing a nonobstructed segment of small bowel. Normal appendix.  Large amount of formed stool throughout the colon and rectum.    LYMPH NODES: No lymphadenopathy.    VASCULATURE: No abdominal aortic aneurysm.    PELVIC ORGANS: Marked prostatomegaly with the prostate gland measuring  7.6 x 6.3 x 9.4 cm    OTHER: No free fluid.    MUSCULOSKELETAL: Degenerative changes in the spine. No suspicious  lesions in the bones. Lumbar scoliosis.      Impression    IMPRESSION:   1.  Hyperdense material in the urinary bladder lumen is likely clot.  Consider cystoscopy to exclude underlying lesions.  2.  Curvilinear calcification in the bladder neck is either in the  urinary bladder wall or in the median lobe of the prostate gland.  3.  Marked prostatomegaly.  4.  No renal calculi, hydronephrosis or renal masses evident on  noncontrast CT.    BEBETO RODARTE MD         SYSTEM ID:  [id number]

## 2021-10-22 NOTE — CONSULTS
[facility]    Neurology Consultation     Date of Admission:  10/21/2021    Assessment & Plan   Bismark Marrero is a 81 year old male who was admitted on 10/21/2021. I was asked to see the patient for advanced Parkinson dz. patient is a 81-year-old gentleman with significant memory loss and behavioral changes as well as hallucinations.  Hallucinations started even before the Covid hospitalization.  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  It is not clear if carbidopa levodopa ever worked.  For now I would recommend the following  - Continue to treat intercurrent illness and continue carbidopa levodopa as is.  - If needed I would recommend to use Seroquel which is not typical antipsychotic for agitation and behavior problem.  I would avoid other antipsychotic or Zyprexa since people with Lewy body disease can be very sensitive to antipsychotic.  - We will continue to check metabolic work-up for memory loss  - Patient will follow-up in neurology clinic with me, to address further issues.    All of these were discussed with patient's wife I will follow-up with you tomorrow.    Yolande Flores MD    Code Status    Full Code    Reason for Consult   Reason for consult: I was asked by dr Starkey to evaluate this patient for advanced Parkinson dz    Primary Care Physician   [facility]    Chief Complaint   advanced Parkinson    History is obtained from the electronic health record    History of Present Illness   Bismark Marrero is a 81 year old male who presents with hematuria.  The patient is asleep and confused cannot give history.  The history was obtained from patient's wife who is present.  She states that last Sunday the patient started bleeding from his urethra and this started to get worse today and brought him to hospital.    Patient's wife states that the patient was diagnosed with possible Parkinson disease in 2014.  He has been on carbidopa levodopa 25/100, 3/day with  not much improvement or no changes in his situation.  Patient's wife states that family members have essential type tremor.    The patient wife states that in April of the patient got infected with Covid and was admitted to [facility] for 2 weeks.  At that time he did not have respiratory symptoms however he was very confused.  Since this hospitalization patient's wife states that the memory started to decline and the patient clinical picture started to become worse.  The patient was admitted at [facility] for 3 months in a memory care, patient's wife felt that he was not being well cared for and took him home.  Since this hospitalization patient memory became worse and worse.  He was still able to ambulate at home with the help for for walker but this has been declining.  He needed more help to stand up from a chair.  At home things were difficult to be taking care of, with the patient being at times disinhibited and agitated.  In emergency room he was also described as being agitated.    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 Taking?   UNABLE TO FIND 10/21/2021 at Unknown time  Yes Yes   Sig: MEDICATION NAME: Neutracuticals for brain and immune system health   Vitamin D3 (CHOLECALCIFEROL) 125 MCG (5000 UT) tablet 10/21/2021 at Unknown time  Yes Yes   Sig: Take 1 tablet by mouth daily   carbidopa-levodopa (SINEMET)  MG tablet 10/21/2021 at 4 PM  Yes Yes   Sig: Take 1 tablet by mouth 3 times daily At 11 am, 3 pm, 7 pm   docusate sodium (COLACE) 100 MG capsule Past Week at Unknown time  Yes Yes   Sig: Take 100 mg by mouth daily as needed for constipation   melatonin 3 MG tablet 10/20/2021 at Unknown time  Yes Yes   Sig: Take 1 mg by mouth nightly as needed for sleep       Facility-Administered Medications: None     Allergies   No Known Allergies    Social History   I have reviewed this patient's social history and updated it with pertinent information if needed. Bismark Marrero      Family History   Family members with essential type tremor    Review of Systems   The 10 point Review of Systems is negative other than noted in the HPI or here.     Physical Exam   Temp: 97.5  F (36.4  C) Temp src: Axillary BP: (!) 108/36 Pulse: 75   Resp: 18 SpO2: 94 % O2 Device: None (Room air)    Vital Signs with Ranges  Temp:  [97.5  F (36.4  C)] 97.5  F (36.4  C)  Pulse:  [75-83] 75  Resp:  [18] 18  BP: (100-154)/() 108/36  SpO2:  [94 %-100 %] 94 %  168 lbs 0 oz    The patient is asleep, he is very hard of hearing patient wife put hearing aid on the right.  He seems to be hearing better and open the eyes briefly for me however he did did not participate to the neuro exam.  He wiggles the toes.  Eyes are conjugate.  Face seems symmetric however the patient does not follow commands.  Muscular tone is increased significantly in all 4 extremities.  There is muscle wasting in all 4 extremities.  Reflexes are brisk in both knees however I cannot get any other reflexes.  Toes are equivocal.    Data   Results for orders placed or performed during the hospital encounter of 10/21/21 (from the past 24 hour(s))   UA with Microscopic reflex to Culture    Specimen: Urine, Midstream   Result Value Ref Range    Color Urine Red (A) Colorless, Straw, Light Yellow, Yellow    Appearance Urine Cloudy (A) Clear    Glucose Urine 500  (A) Negative mg/dL    Bilirubin Urine Negative Negative    Ketones Urine Negative Negative mg/dL    Specific Gravity Urine 1.033 1.003 - 1.035    Blood Urine Large (A) Negative    pH Urine 6.5 5.0 - 7.0    Protein Albumin Urine 200  (A) Negative mg/dL    Urobilinogen Urine Normal Normal, 2.0 mg/dL    Nitrite Urine Negative Negative    Leukocyte Esterase Urine Negative Negative    RBC  Urine >182 (H) <=2 /HPF    WBC Urine 57 (H) <=5 /HPF    Narrative    Urine Culture ordered based on laboratory criteria   Urine Culture    Specimen: Urine, Midstream   Result Value Ref Range    Culture No growth, less than 1 day    Asymptomatic COVID-19 Virus (Coronavirus) by PCR Nasopharyngeal    Specimen: Nasopharyngeal; Swab   Result Value Ref Range    SARS CoV2 PCR Negative Negative    Narrative    Testing was performed using the anitra  SARS-CoV-2 & Influenza A/B Assay on the anitra  Sheri  System.  This test should be ordered for the detection of SARS-COV-2 in individuals who meet SARS-CoV-2 clinical and/or epidemiological criteria. Test performance is unknown in asymptomatic patients.  This test is for in vitro diagnostic use under the FDA EUA for laboratories certified under CLIA to perform moderate and/or high complexity testing. This test has not been FDA cleared or approved.  A negative test does not rule out the presence of PCR inhibitors in the specimen or target RNA in concentration below the limit of detection for the assay. The possibility of a false negative should be considered if the patient's recent exposure or clinical presentation suggests COVID-19.  [facility] are certified under the Clinical Laboratory Improvement Amendments of 1988 (CLIA-88) as qualified to perform moderate and/or high complexity laboratory testing.   Hemoglobin   Result Value Ref Range    Hemoglobin 8.9 (L) 13.3 - 17.7 g/dL   Hemoglobin   Result Value Ref Range    Hemoglobin 9.1 (L) 13.3 - 17.7 g/dL   Basic metabolic panel   Result Value Ref Range    Sodium 139 133 - 144 mmol/L    Potassium 3.8 3.4 - 5.3 mmol/L    Chloride 108 94 - 109 mmol/L    Carbon Dioxide (CO2) 26 20 - 32 mmol/L    Anion Gap 5 3 - 14 mmol/L    Urea Nitrogen 18 7 - 30 mg/dL    Creatinine 0.91 0.66 - 1.25 mg/dL    Calcium 8.8 8.5 - 10.1 mg/dL    Glucose 94 70 - 99 mg/dL    GFR Estimate 78 >60 mL/min/1.73m2   CBC with platelets   Result  Value Ref Range    WBC Count 6.7 4.0 - 11.0 10e3/uL    RBC Count 3.53 (L) 4.40 - 5.90 10e6/uL    Hemoglobin 9.1 (L) 13.3 - 17.7 g/dL    Hematocrit 28.5 (L) 40.0 - 53.0 %    MCV 81 78 - 100 fL    MCH 25.8 (L) 26.5 - 33.0 pg    MCHC 31.9 31.5 - 36.5 g/dL    RDW 15.8 (H) 10.0 - 15.0 %    Platelet Count 186 150 - 450 10e3/uL   INR   Result Value Ref Range    INR 1.11 0.85 - 1.15   CT Abdomen Pelvis w/o Contrast    Narrative    CT ABDOMEN PELVIS W/O CONTRAST 10/22/2021 9:20 AM    CLINICAL HISTORY: Hematuria, unknown cause  TECHNIQUE: CT scan of the abdomen and pelvis was performed without IV  contrast. Multiplanar reformats were obtained. Dose reduction  techniques were used.  CONTRAST: None.    COMPARISON: None.    FINDINGS:   LOWER CHEST: Mild bibasilar atelectasis/scarring. Small hiatal hernia.    HEPATOBILIARY: Normal.    PANCREAS: Fatty atrophy of the pancreatic parenchyma in the head and  body.    SPLEEN: Few punctate calcified granulomas.    ADRENAL GLANDS: Normal.    KIDNEYS/BLADDER: No urinary system calculi, hydronephrosis or  perinephritic stranding. No renal masses evident on this noncontrast  CT. Hyperdense material within the urinary bladder lumen is  indeterminate (series 3, image 152), likely clot within the urinary  bladder. Curvilinear calcification in the bladder neck measuring about  3.0 x 0.9 cm (series 3, image 156), either in the urinary bladder wall  or median lobe of the prostate gland.    BOWEL: Normal caliber loops of small bowel and colon. Umbilical hernia  containing a nonobstructed segment of small bowel. Normal appendix.  Large amount of formed stool throughout the colon and rectum.    LYMPH NODES: No lymphadenopathy.    VASCULATURE: No abdominal aortic aneurysm.    PELVIC ORGANS: Marked prostatomegaly with the prostate gland measuring  7.6 x 6.3 x 9.4 cm    OTHER: No free fluid.    MUSCULOSKELETAL: Degenerative changes in the spine. No suspicious  lesions in the bones. Lumbar scoliosis.       Impression    IMPRESSION:   1.  Hyperdense material in the urinary bladder lumen is likely clot.  Consider cystoscopy to exclude underlying lesions.  2.  Curvilinear calcification in the bladder neck is either in the  urinary bladder wall or in the median lobe of the prostate gland.  3.  Marked prostatomegaly.  4.  No renal calculi, hydronephrosis or renal masses evident on  noncontrast CT.    BEBETO RODARTE MD         SYSTEM ID:  [id number]

## 2021-10-22 NOTE — CONSULTS
Care Management Initial Consult    General Information  Assessment completed with: Spouse or significant other, Nanmarie, Wife  Type of CM/SW Visit: CM Role Introduction    Primary Care Provider verified and updated as needed: Yes (Dr. Barnes, [facility])   Readmission within the last 30 days:        Reason for Consult: discharge planning  Advance Care Planning:   None on file         Communication Assessment  Patient's communication style: spoken language (English or Bilingual)             Cognitive  Cognitive/Neuro/Behavioral: .WDL except, mood/behavior  Level of Consciousness: confused  Arousal Level: arouses to repeated stimulation  Orientation: disoriented x 4  Mood/Behavior: agitated  Best Language: 0 - No aphasia  Speech: whispers    Living Environment:   People in home: spouse     Current living Arrangements: house      Able to return to prior arrangements: yes  Living Arrangement Comments: Lives with spouse Annmarie    Family/Social Support:  Care provided by: spouse/significant other  Provides care for: no one, unable/limited ability to care for self  Marital Status:   Wife, Children  Annmarie       Description of Support System: Supportive, Involved    Support Assessment: Adequate family and caregiver support, Adequate social supports    Current Resources:   Patient receiving home care services:  Patient has previously received home care through [facility]. Not currently enrolled in services.      Community Resources:    Equipment currently used at home:  Transport Chair, grab bars, Spouse was working on getting a lift.    Supplies currently used at home:      Employment/Financial:  Employment Status: retired        Financial Concerns: No concerns identified   Referral to Financial Counselor: No       Lifestyle & Psychosocial Needs:  Social Determinants of Health     Tobacco Use:      Smoking Tobacco Use:      Smokeless Tobacco Use:    Alcohol Use:      Frequency of Alcohol Consumption:       Average Number of Drinks:      Frequency of Binge Drinking:    Financial Resource Strain:      Difficulty of Paying Living Expenses:    Food Insecurity:      Worried About Running Out of Food in the Last Year:      Ran Out of Food in the Last Year:    Transportation Needs:      Lack of Transportation (Medical):      Lack of Transportation (Non-Medical):    Physical Activity:      Days of Exercise per Week:      Minutes of Exercise per Session:    Stress:      Feeling of Stress :    Social Connections:      Frequency of Communication with Friends and Family:      Frequency of Social Gatherings with Friends and Family:      Attends Orthodoxy Services:      Active Member of Clubs or Organizations:      Attends Club or Organization Meetings:      Marital Status:    Intimate Partner Violence:      Fear of Current or Ex-Partner:      Emotionally Abused:      Physically Abused:      Sexually Abused:    Depression:      PHQ-2 Score:    Housing Stability:      Unable to Pay for Housing in the Last Year:      Number of Places Lived in the Last Year:      Unstable Housing in the Last Year:        Functional Status:  Prior to admission patient needed assistance:    Patient was residing at home with spouse who is his primary caretaker.  Patient's son Barak lives down the street and will provide care for patient to assist spouse.  They also have a granddaughter who is in nursing school who will come to assist.     Mental Health Status:      None indicated    Chemical Dependency Status:            None indicated    Values/Beliefs:  Spiritual, Cultural Beliefs, Orthodoxy Practices, Values that affect care:                 Additional Information:  SW met with patient and spouse in patient's room and introduced self and care management role.  Spouse reports that patient resides at home in their rambler style home where she is his primary caregiver.  Son Barak and granddaughter will also assist as needed.  Spouse reports that davy Munoz  has Guillain-[place] as a result of Covid-19 and is not currently able to assist patient.     Spouse reports that patient has previously been in transitional care three times.  He has previously been at [facility] and [facility] (x2).  He was most recently at [facility] but spouse decided to take patient home from the facility as he was placed on the dementia unit and she believes that he got worse from the lack of interaction and communication with others while at the facility.  Spouse states that since patient had Covid around [facility], he has not been getting up and has been weaker.     SW discussed that PT/OT would see patient and would make recommendations for placement upon discharge from the hospital.  Spouse would prefer to take patient home with home care and would be willing to use private duty care to assist if needed.  SW provided some options for private duty agencies of [facility] and [facility]. Spouse feels that they are able to provide care for patient and she states she is comfortable with him returning home after hospital stay or TCU stay, whichever is recommended.  Confirmed address and PCP. Spouse reports patient's PCP is Dr. Barnes with [facility], who is currently on a leave. Spouse states that she and her son Barak will provide transportation for patient when ready for discharge. If TCU is recommended, they would prefer to be in the [facility] and would prefer [facility] or [facility].  SW discussed that due to bed availability for patients at facilities, we will attempt to establish their preferred choice, however, may need to send additional referrals out to facilities outside of their preferred location.  Spouse stated she was in understanding.  SW will continue to assist with discharge planning as needed.     VIRGIE Gan, LGSW  [phone]  [facility]

## 2021-10-22 NOTE — PROGRESS NOTES
UROLOGY BRIEF NOTE    Pt admitted with gross hematuria. Has hx of prostate cancer and recurrent gross hematuria. Passing clots per chart review, no catheter in place. Holding anticoagulation currently. Creat stable, UA does not appear infected, Hb down to 8.9.     PLAN:   RN to obtain and record bladder scans, if PVR >350cc then consider placement of 22F 3-way ibarra   Will order CT abd/pelvis to eval for bladder clots, if clots present then would proceed with ibarra placement as above and hand irrigations   Trend hb, hold anticoagulation   No plans for emergent procedural intervention today, would consider cystoscopy and clot evacuation with fulguration under anesthesia pending the above findings; ok for diet today     Esperanza Cole PA-C will see this patient midday for formal consultation midday     Shelly León PA-C  [facility]   [email or web address]  Text Page (7:30am to 4:30pm)

## 2021-10-22 NOTE — ED NOTES
[facility]  ED Nurse Handoff Report    ED Chief complaint: Hematuria and Generalized Weakness      ED Diagnosis:   Final diagnoses:   Gross hematuria       Code Status: MD giselle pickard    Allergies: No Known Allergies    Patient Story: weakness and hematuria  Focused Assessment: Patient with a history of Parkinson's disease, prostate cancer, Schwannoma, and COVID-19 who presents with hematuria and generalized weakness. The patient's wife reports that for the past five days, he has been bleeding through his urethra. This has happened before, most recently two years ago, at which time he was told it was due to an enlarged prostate with a blood vessel that should be cauterized.    Treatments and/or interventions provided: No intervention at this time. Zyprexa and Seroquel pending   Patient's response to treatments and/or interventions: na    To be done/followed up on inpatient unit:  close monitoring    Does this patient have any cognitive concerns: Sun downing    Activity level - Baseline/Home:  Wheelchair  Activity Level - Current:   Wheelchair    Patient's Preferred language: English   Needed?: No    Isolation: None  Infection: Not Applicable  Patient tested for COVID 19 prior to admission: YES  Bariatric?: No    Vital Signs:   Vitals:    10/21/21 1403   BP: (!) 159/71   Pulse: 94   Resp: 18   Temp: 97.8  F (36.6  C)   TempSrc: Oral   SpO2: 98%       Cardiac Rhythm:     Was the PSS-3 completed:   Yes  What interventions are required if any?               Family Comments: wife at bedside  OBS brochure/video discussed/provided to patient/family: Yes              Name of person given brochure if not patient: na              Relationship to patient: na    For the majority of the shift this patient's behavior was Green.   Behavioral interventions performed were none.    ED NURSE PHONE NUMBER: *[phone]

## 2021-10-22 NOTE — PROGRESS NOTES
RECEIVING UNIT ED HANDOFF REVIEW    ED Nurse Handoff Report was reviewed by: Geneva Haile RN on October 21, 2021 at 9:50 PM

## 2021-10-22 NOTE — PLAN OF CARE
Observation goals PRIOR TO DISCHARGE     -diagnostic tests and consults completed and resulted not met  -vital signs normal or at patient baseline met  -safe disposition plan has been identified not met     Contact precautions. A/O to self only. Hx dementia and sundowning, 1:1 in room, green behavior. Ax1-2 wheelchair bound. Mumbling unclear speech. Incontinence + briefs and repos. Urine is dark red/tea colored, no visualization of clots in soiled briefs. Easily agitated. hgb labs q6H.  R PIV S/L'd. Regular diet, encourage fluids. Slept most of the night. Urine culture pending. Urology recommendations pending -  tentative plan: cytoscopy for today today. Consults PT/OT//SW.

## 2021-10-22 NOTE — PROGRESS NOTES
.Observation goals PRIOR TO DISCHARGE     -diagnostic tests and consults completed and resulted not met  -vital signs normal or at patient baseline Partially met, long arm sit at 11 am  -safe disposition plan has been identified not met

## 2021-10-22 NOTE — PLAN OF CARE
Observation goals PRIOR TO DISCHARGE     -diagnostic tests and consults completed and resulted not met  -vital signs normal or at patient baseline Partially met. Still sit  -safe disposition plan has been identified not met       Contact precautions. A/O to self only. Anxious pulling, aggressive, swinging at times. Up w/ assist 2, wheel chair bound at baseline. None verbal this shift. Incontinence + briefs. T&R.  IV SL. Urology placed ibarra, draining dark red/tea colored urine. Hgb 9.1. Regular diet, encourage fluids. Urine culture pending. CT of abdominopelvic CT resulted.  Urology following. Plan for NPO after midnight for procedure. PT/Ot following.

## 2021-10-22 NOTE — CONSULTS
[facility] Inpatient Consultation Note    Bismark Marrero MRN# [id number]   Age: 81 year old YOB: 1940     Date of Admission:  10/21/2021    Reason for consult: Hematuria       Requesting physician: Joaquina Garner MD                History of Present Illness:   Bismark Marrero is a 81 year old year old male with history of prostatism, elevated PSA, recurrent hematuria, Parksinon's disease with dementia admitted to [facility] 10/21/2.  The patient is seen at the request of Dr. Garner for gross hematuria.    History is obtained via chart review and by assistance from the patient's wife, Annmarie.  Onset of hematuria began 5 days ago. His wife noted small clots in his Depends. Yesterday, she noted he had passed quite a large clot. She was instructed by staff at [facility] triage to present to the ED for further management.  The patient's wife does not believe he has had any dysuria, fevers, or chills. He is incontinent of urine at night but voids on his own during the daytime. He is not on any anticoagulant medications.     His primary urologist is Dr. Saleem, last seen in 2019. Cystoscopy at that time demonstrated large middle lobe of prostate without any bladder tumors. He is s/p TURP in 2016 with 41g resected.  He has been on finasteride in the past, but discontinued it some time ago as his wife was concerned it was contributing to his neurologic decline.  Clinic note from 01/2016 with Dr. Saleem notes the patient has had multiple prostate biopsies in the past with Dr. Wen. I have no record of the pathology reports and no documentation of the diagnosis of prostate cancer.  PSA = 8.68 (10/2016)    UA yesterday had >182 RBC, 57 WBC, 200 protein, nitrite and leuk negative.  UCx pending. He is without leukocytosis or fever.  Hgb = 11.0 on admission, now 9.1 this morning.  Cr = 0.91. PVRs recorded today, 74 and 121cc. CT 10/22 demonstrates hyperdense material in bladder consistent with clot,  calcification of either the bladder wall or median lobe of prostate - measuring 3.0 x 0.9 cm, as well as marked prostatomegaly.  Upon evaluation this afternoon, the patient is confused and combative.             Past Medical History:   No past medical history on file.          Past Surgical History:   No past surgical history on file.          Social History:     Social History     Socioeconomic History     Marital status:      Spouse name: Not on file     Number of children: Not on file     Years of education: Not on file     Highest education level: Not on file   Occupational History     Not on file   Tobacco Use     Smoking status: Not on file   Substance and Sexual Activity     Alcohol use: Not on file     Drug use: Not on file     Sexual activity: Not on file   Other Topics Concern     Not on file   Social History Narrative     Not on file     Social Determinants of Health     Financial Resource Strain:      Difficulty of Paying Living Expenses:    Food Insecurity:      Worried About Running Out of Food in the Last Year:      Ran Out of Food in the Last Year:    Transportation Needs:      Lack of Transportation (Medical):      Lack of Transportation (Non-Medical):    Physical Activity:      Days of Exercise per Week:      Minutes of Exercise per Session:    Stress:      Feeling of Stress :    Social Connections:      Frequency of Communication with Friends and Family:      Frequency of Social Gatherings with Friends and Family:      Attends Protestant Services:      Active Member of Clubs or Organizations:      Attends Club or Organization Meetings:      Marital Status:    Intimate Partner Violence:      Fear of Current or Ex-Partner:      Emotionally Abused:      Physically Abused:      Sexually Abused:              Family History:   No family history on file.          Immunizations:     There is no immunization history on file for this patient.          Allergies:   No Known Allergies           Medications:     Current Facility-Administered Medications   Medication     carbidopa-levodopa (SINEMET)  MG per tablet 1 tablet     docusate sodium (COLACE) capsule 100 mg     magnesium hydroxide (MILK OF MAGNESIA) suspension 30 mL     melatonin tablet 3 mg     ondansetron (ZOFRAN-ODT) ODT tab 4 mg    Or     ondansetron (ZOFRAN) injection 4 mg     QUEtiapine (SEROquel) tablet 25 mg     senna-docusate (SENOKOT-S/PERICOLACE) 8.6-50 MG per tablet 1 tablet    Or     senna-docusate (SENOKOT-S/PERICOLACE) 8.6-50 MG per tablet 2 tablet             Review of Systems:   Comprehensive review of systems from the Admission note dated 10/21/21 at [facility] was reviewed with no changes except per HPI.     Examination:  BP (!) 108/36 (BP Location: Left arm)   Pulse 75   Temp 97.5  F (36.4  C) (Axillary)   Resp 18   Wt 76.2 kg (168 lb)   SpO2 94%   General: Confused, easily agitated, physically combative. Mitts in place.  HEENT: Face symmetric, mucous membranes moist and pink  Neck: Symmetric  Chest wall: Symmetric  Respiratory: Breathing unlabored, no audible wheezing  Cardiac: Extremities warm and well perfused, no edema  Abdomen: soft, non tender, non distended; no rebound, guarding or peritoneal signs  Back: No CVA or flank tenderness  : Phallus uncircumcised with phimosis. No SP tenderness. 22Fr 3-way catheter placed. Hand irrigated with 400cc sterile water, no clots were able to be irrigated out.  Extremities: No evidence of deformities or trauma  Neuro: UE tremor  Skin: No evident rashes or lesions    Procedure: 22Fr coude 3-way Brar placement, Hand irrigations  Using sterile method the patient's meatus was draped and prepped with betadine.  A 22 Belarusian 3-way coude catheter was copiously lubricated and was introduced into the urethra with minimal resistace and advanced. Dark red urine returned and the balloon was inflated with 20cc of sterile water. The catheter seated well against the  bladder wall and a plug was inserted in to the irrigation port.     The catheter was then irrigated with a 60cc catheter-tip syringe. Approximately 180cc of sterile water was instilled into the bladder prior to pulling back.  400cc of sterile water was used to irrigate in total.  No organized clots were able to be irrigated through the catheter tubing. The ibarra was secured to the patient's leg with a stat lock on small amount of traction and tubing was secured via clip to patient's bed sheets. The patient tolerated the procedure well.               Data:     Lab Results   Component Value Date    WBC 6.7 10/22/2021     Lab Results   Component Value Date    RBC 3.53 10/22/2021     Lab Results   Component Value Date    HGB 9.1 10/22/2021    HGB 9.1 10/22/2021     Lab Results   Component Value Date    HCT 28.5 10/22/2021     Lab Results   Component Value Date     10/22/2021     Creatinine   Date Value Ref Range Status   10/22/2021 0.91 0.66 - 1.25 mg/dL Final   ]  Lab Results   Component Value Date    BUN 18 10/22/2021       Imaging:    CT ABDOMEN PELVIS W/O CONTRAST (10/22/2021)  IMPRESSION:   1.  Hyperdense material in the urinary bladder lumen is likely clot.  Consider cystoscopy to exclude underlying lesions.  2.  Curvilinear calcification in the bladder neck is either in the  urinary bladder wall or in the median lobe of the prostate gland.  3.  Marked prostatomegaly.  4.  No renal calculi, hydronephrosis or renal masses evident on  noncontrast CT.    Impression:  81 year old yo male with prostatomegaly admitted with gross hematuria with clots. UA not concerning for infection; UCx with no growth.  CT with organized clot in bladder and large calcification in bladder vs prostate.  22Fr 3-way ibarra in place.     Plan:  - Trend Hgb   - Mitts and necessary restraints to prevent traumatic removal of ibarra given patient's confusion and agitation  - Continue 22Fr 3-way catheter, hand irrigate PRN if clots noted in  tubing or for concern of obstruction  - Begin CBI if requiring multiple hand irrigations  - NPO at midnight. Will discuss with Dr. Phelan regarding need for possible cysto/clot evac and possible fulguration tomorrow    This patient was discussed with Dr. Phelan.    Esperanza Cole PA-C  [facility]   [email or web address]  Text Page (7:30am to 4:30pm)

## 2021-10-22 NOTE — PROGRESS NOTES
Observation goals PRIOR TO DISCHARGE     -diagnostic tests and consults completed and resulted not met  -vital signs normal or at patient baseline not met, patient refusing vitals at times  -safe disposition plan has been identified not met

## 2021-10-23 ENCOUNTER — APPOINTMENT (OUTPATIENT)
Dept: PHYSICAL THERAPY | Facility: CLINIC | Age: 81
DRG: 726 | End: 2021-10-23
Attending: HOSPITALIST
Payer: MEDICARE

## 2021-10-23 LAB
HGB BLD-MCNC: 9.7 G/DL (ref 13.3–17.7)
HGB BLD-MCNC: 9.9 G/DL (ref 13.3–17.7)

## 2021-10-23 PROCEDURE — 36415 COLL VENOUS BLD VENIPUNCTURE: CPT | Performed by: INTERNAL MEDICINE

## 2021-10-23 PROCEDURE — 250N000013 HC RX MED GY IP 250 OP 250 PS 637: Performed by: HOSPITALIST

## 2021-10-23 PROCEDURE — 120N000004 HC R&B MS OVERFLOW

## 2021-10-23 PROCEDURE — 97161 PT EVAL LOW COMPLEX 20 MIN: CPT | Mod: GP | Performed by: PHYSICAL THERAPIST

## 2021-10-23 PROCEDURE — 85018 HEMOGLOBIN: CPT | Performed by: INTERNAL MEDICINE

## 2021-10-23 PROCEDURE — 99233 SBSQ HOSP IP/OBS HIGH 50: CPT | Performed by: INTERNAL MEDICINE

## 2021-10-23 PROCEDURE — 97530 THERAPEUTIC ACTIVITIES: CPT | Mod: GP | Performed by: PHYSICAL THERAPIST

## 2021-10-23 RX ADMIN — MELATONIN TAB 3 MG 3 MG: 3 TAB at 23:27

## 2021-10-23 RX ADMIN — QUETIAPINE FUMARATE 25 MG: 25 TABLET ORAL at 19:01

## 2021-10-23 RX ADMIN — CARBIDOPA AND LEVODOPA 1 TABLET: 25; 100 TABLET ORAL at 19:01

## 2021-10-23 RX ADMIN — QUETIAPINE FUMARATE 25 MG: 25 TABLET ORAL at 08:57

## 2021-10-23 RX ADMIN — CARBIDOPA AND LEVODOPA 1 TABLET: 25; 100 TABLET ORAL at 15:23

## 2021-10-23 RX ADMIN — CARBIDOPA AND LEVODOPA 1 TABLET: 25; 100 TABLET ORAL at 11:48

## 2021-10-23 ASSESSMENT — ACTIVITIES OF DAILY LIVING (ADL)
ADLS_ACUITY_SCORE: 19
ADLS_ACUITY_SCORE: 16
ADLS_ACUITY_SCORE: 18
ADLS_ACUITY_SCORE: 18
ADLS_ACUITY_SCORE: 19
ADLS_ACUITY_SCORE: 19
ADLS_ACUITY_SCORE: 16
ADLS_ACUITY_SCORE: 19
ADLS_ACUITY_SCORE: 18
ADLS_ACUITY_SCORE: 16
ADLS_ACUITY_SCORE: 19
ADLS_ACUITY_SCORE: 19
ADLS_ACUITY_SCORE: 16
ADLS_ACUITY_SCORE: 20
ADLS_ACUITY_SCORE: 16
ADLS_ACUITY_SCORE: 17
ADLS_ACUITY_SCORE: 16
ADLS_ACUITY_SCORE: 16

## 2021-10-23 NOTE — PLAN OF CARE
VSS, pt does not appear to be in any pain. Pt agitated and combative at times has mitts on hands to prevent him from pulling out his catheter. Catheter was irrigated with 60cc of normal saline at 2000 no clots returned just bloody urine output.

## 2021-10-23 NOTE — PROVIDER NOTIFICATION
MD Notification    Notified Person: MD    Notified Person Name: [facility]    Notification Date/Time: [employer] messaging     Notification Interaction: 10/23/21 at 1:08pm    Purpose of Notification: Just clarifying I thought from our conversation that you want the patient bladder irrigated in evening. Do you want bladder irrigatation every 4 hours like before?     Orders Received:    Comments:

## 2021-10-23 NOTE — PLAN OF CARE
OT: Orders received. Chart reviewed and discussed with care team.  OT not indicated due to weakness and confusion.  Pt is significantly below his baseline, needing Max A of 2 for mobility.  PT is working with pt on mobility goals in the IP setting.  Plan is for pt to discharge to TCU, recommend OT evaluation once at TCU..  Defer discharge recommendations to treatment team.  Will complete orders.

## 2021-10-23 NOTE — PLAN OF CARE
AVSS; pt confused with minimal verbalization; calm/sleeping most of the night, but very agitated/combative with assessments/cares; bladder irrigated every 4 hours with 60-90 ml of NS using a cath tipped syringe; very light pink urine returned with no clots noted; urine output 450 ml; pt NPO after midnight for possible urology procedure; PT/OT/Neurology/Urology consults ordered; pt turned/repositioned.

## 2021-10-23 NOTE — PROGRESS NOTES
UROLOGY PROGRESS NOTE    October 23, 2021     HPI/SUBJECTIVE:   Per nursing note, hand irrigations performed Q4 hrs with no return of clots.  Spoke with AM shift RN this morning and one small clot noted in tubing this morning, no concern of obstruction.     This AM on rounds UOP is dark cherry colored, lighter than yesterday. I performed hand irrigations until clear with 400cc sterile water; no clots returned.     /1025cc   Hgb 9.9, stable      OBJECTIVE:          /69 (BP Location: Left arm)   Pulse 81   Temp 97.9  F (36.6  C) (Axillary)   Resp 16   Wt 76.2 kg (168 lb)   SpO2 95%     Intake/Output Summary (Last 24 hours) at 10/23/2021 0817  Last data filed at 10/23/2021 0619  Gross per 24 hour   Intake 580 ml   Output 1025 ml   Net -445 ml       GENERAL: Resting in no apparent distress, confused, agitated with irrigations. Lying supine in bed  HEAD: atraumatic, normocephalic  RESPIRATORY: breathing unlabored  ABDOMEN: soft, non tender, non distended, no rebound or guarding  : 22Fr 3-way ibarra draining cherry red urine, no clots in tubing and no concern for obstruction. Hand irrigations performed with 400cc sterile water until clear; no return of clots.      IMAGING:     CT ABDOMEN PELVIS W/O CONTRAST (10/22/2021)  IMPRESSION:   1.  Hyperdense material in the urinary bladder lumen is likely clot.  Consider cystoscopy to exclude underlying lesions.  2.  Curvilinear calcification in the bladder neck is either in the  urinary bladder wall or in the median lobe of the prostate gland.  3.  Marked prostatomegaly.  4.  No renal calculi, hydronephrosis or renal masses evident on  noncontrast CT.    ASSESSMENT: 81 year old yo male with marked prostatomegaly (7.6 x 6.3 x 9.4 cm), admitted with gross hematuria and clots. UA not concerning for infection; UCx <10k mixed growth.  CT with hyperdense material (?clot vs underlying bladder lesion) and calcification of bladder neck vs median lobe prostate.  22Fr  3-way ibarra in place. Hand irrigations without clots. Hgb stable (9.9), has not required transfusions.      Plan:  - Regular diet today.  No plan for surgical intervention today.   - Reviewed imaging with Dr. Phelan. Suspect origin of hematuria is from massive prostate.   - No indication for urgent cysto in OR given patient is hemodynamically stable, not requiring transfusions, relatively small clot burden on CT, no clots on hand irrigations. Would prefer to avoid cysto under anesthesia given his neurologic status, as well.   - Cysto as outpatient with Dr. Saleem to further assess underlying prostate/bladder pathology (AVS updated)  - Trend Hgb  - Recommend finasteride, though will hold as patient's wife is not interested in resuming this medication as she is concerned it contributed to his development of Parkinson's and dementia in the past.   - Continue 22Fr 3-way ibarra today, hand irrigate Q4 hrs today and PRN if clots noted in tubing or for concern of obstruction. Patient actually emptied bladder well on admission. Will plan for ibarra removal 0600 tomorrow AM with TOV prior to planned discharge.     This patient was discussed with Dr. Phelan.      Esperanza Cole PA-C  [facility]   [email or web address]  Text Page (7:30am to 4:30pm)

## 2021-10-23 NOTE — PROGRESS NOTES
[facility]    HOSPITALIST PROGRESS NOTE :   --------------------------------------------------    Date of Admission:  10/21/2021    Cumulative Summary: Bismark Marrero is a 81 year old male with HTN, history of prostate ca, chronic recurrent hematuria, parkinson's disease with dementia admitted on 10/21/2021 for gross hematuria.    Assessment & Plan     Magdiel hematuria with clots  Acute blood loss anemia, mild  Hgb relatively stable from previous. Hemodynamically stable. Hematuria started on Sunday but patient passed a large clot with a large amount of urine on the morning of admission .  Patient wad admitted for further evaluation by urology   Patient primary urologist is Dr. Saleem, last seen in 2019.  Cystoscopy at that time demonstrated large median lobe of prostate without any bladder tumors.  Patient has undergone TURP in 2016 with 41 g resected.  He has been on finasteride in the past which was discontinued sometime ago by his wife who was concerned that it was contributing to his neurological decline.  Clinic note from January 2016 with Dr. Maza notes that patient had multiple prostate biopsies in the past with Dr. Wen, but currently urology is unable to find any documentation.  PSA was up to 8.68 in October 2016.  Per urology , there is concern for bleeding from prostate hyperplasia     -- Patient was seen and examined, feeling better , aid present at the bedside , helping in feeding   -- patient Hgb remains stable and hematuria is improving , patient has been evaluated by urology , no plan for procedure today   -- Start patient on regular diet   -- will continue to Check Hgb every 12 hours , does not need further frequent checks   -- Patient is not on any home AC and is not receiving any chemical DVT prophylaxis at this time ,as urology is recommending to hold AC  -- per Dr. Phelan from urology , Suspect origin of hematuria is from massive prostate  -- CT scan of the abdomen and  pelvis is done this morning , results are reviewed , showing Hyperdense material in the urinary bladder lumen is likely clot.Curvilinear calcification in the bladder neck is either in the  urinary bladder wall or in the median lobe of the prostate gland  Consider cystoscopy to exclude underlying lesions. Marked prostatomegaly.  -- Plan for Cysto as outpatient with Dr. Saleem to further assess underlying prostate/bladder pathology   -- Currently patient is not on any antibiotics   -- So far patient has not required any antibiotics   -- urology is recommending Finasteride, ut holding as patient's wife is not interested in resuming this medication as she is concerned it contributed to his development of Parkinson's and dementia in the past. Will let primary urologist discuss with family further   -- urology is recommending to continue 22Fr 3-way ibarra today, hand irrigate Q4 hrs today and PRN if clots noted in tubing or for concern of obstruction. Patient actually emptied bladder well on admission. plan for ibarra removal 0600 tomorrow AM with TOV prior to planned discharge     Urinary incontinence:   -- wears depends at night  -- current bladder care as above      Parkinson's disease  Dementia with behavioral disturbance: on PTA sinemet. There was some question of Lewy body dementia. No current follow up with neurology as patient's wife did not like follow up at [facility]. She gives sinemet intermittently and doesn't notice change with giving or not giving medication. Patient has been sundowning at home and wife provides all cares with assistance from son who lives nearby.    -- continue sinemet TID for now  -- started seroquel BID and patient will likely need sitter when family not present  -- patient seems to be doing well with Seroquel  -- appreciate neurology input , recommending to continue with the current Sinemet dose , now has been receiving it more regularly at home and has been doing well , will encourage  "wife to continue regularly with the current meds  -- Follow up with neurology as an out patient      Generalized weakness  Bedbound  Malnutrition  Severely debilitated since hospitalization with COVID in April. He spent 3 months in a TCU/LTC facility but wife said she brought him home because he was put on the dementia unit and she felt care would be better at home. Since he has come home he has had progressing dementia and behavioral issues including sundowning. Wife has not talked to anyone about these issues and had many questions about what is \"normal.\" She does seem somewhat overwhelmed with cares. Patient needs assistance to ambulate, get up from chair. He spends most time sitting in a chair. Very agitated and confused in the ED. We did discuss medication for delirium and sundowning and patient's wife was okay with trying something.    -- will avoid Zyprexa  -- doing well with seroquel  -- PT/OT is ordered , appreciate  help in discussing the safe disposition plan with wife , at this time plan for patient to go to TCU if recommended , patient will go home after the TCU  -- If TCU is not recommended then wife will take him home with [facility] and will also try to get private services to help   -- will follow up on therapies recommendations   -- continue fall precautions     COVID 19 PCR came back negative . Recovered COVID 4/21.    Clinically Significant Risk Factors Present on Admission                Diet: Regular Diet Adult    Brar Catheter: PRESENT, indication: Gross Hematuria  DVT Prophylaxis: Pneumatic Compression Devices, AC on hold   Code Status: Full Code    The patient's care was discussed with the Bedside Nurse and Patient.    Disposition Plan   Expected discharge:will follow up on PT and OT recommendations , currently patient is living at home with his wife , plan for discharge to TCU if recommended other wise will go home with wife   Entered: Almita Starkey MD 10/23/2021, 10:26 AM     Almita " MD Jaky, FACP  Text Page (7am - 6pm)    ----------------------------------------------------------------------------------------------------------------------    Interval History    Patient was seen and examined , feeling ok , more alert and awake , confused but cooperative , aid is feeding him breakfast , only complaint is feeling tired , was not able to provide any significant history     -Data reviewed today: I reviewed all new labs and imaging results over the last 24 hours.    I personally reviewed no images or EKG's today.    Physical Exam   Temp: 97.9  F (36.6  C) Temp src: Axillary BP: 133/69 Pulse: 81   Resp: 16 SpO2: 95 % O2 Device: None (Room air)    Vitals:    10/22/21 0317   Weight: 76.2 kg (168 lb)     Vital Signs with Ranges  Temp:  [97.2  F (36.2  C)-98.3  F (36.8  C)] 97.9  F (36.6  C)  Pulse:  [56-90] 81  Resp:  [16-18] 16  BP: (100-137)/(36-71) 133/69  SpO2:  [90 %-100 %] 95 %  I/O last 3 completed shifts:  In: 580 [P.O.:580]  Out: 1025 [Urine:1025]    GENERAL: Alert , awake and oriented. NAD. Conversational, appropriate.   HEENT: Normocephalic. EOMI. No icterus or injection. Nares normal.   LUNGS: Clear to auscultation. No dyspnea at rest.   HEART: Regular rate. Extremities perfused.   ABDOMEN: Soft, nontender, and nondistended. Positive bowel sounds.   EXTREMITIES: No LE edema noted.   NEUROLOGIC: Moves extremities x4 on command. No acute focal neurologic abnormalities noted.     Medications       carbidopa-levodopa  1 tablet Oral TID     QUEtiapine  25 mg Oral BID       Data   Recent Labs   Lab 10/23/21  0603 10/22/21  1826 10/22/21  0659 10/22/21  0009 10/21/21  1410   WBC  --   --  6.7  --  7.2   HGB 9.9* 9.9* 9.1*  9.1*   < > 11.0*   MCV  --   --  81  --  82   PLT  --   --  186  --  233   INR  --   --  1.11  --   --    NA  --   --  139  --  137   POTASSIUM  --   --  3.8  --  3.6   CHLORIDE  --   --  108  --  104   CO2  --   --  26  --  24   BUN  --   --  18  --  19   CR  --   --  0.91  --   0.86   ANIONGAP  --   --  5  --  9   UBALDO  --   --  8.8  --  8.9   GLC  --   --  94  --  199*   ALBUMIN  --   --   --   --  3.9   PROTTOTAL  --   --   --   --  7.4   BILITOTAL  --   --   --   --  0.6   ALKPHOS  --   --   --   --  74   ALT  --   --   --   --  12   AST  --   --   --   --  10    < > = values in this interval not displayed.       Imaging:   No results found for this or any previous visit (from the past 24 hour(s)).

## 2021-10-23 NOTE — PROGRESS NOTES
Observation Goals:     -diagnostic tests and consults completed and resulted - not met  -vital signs normal or at patient baseline - met  -safe disposition plan has been identified - not met

## 2021-10-23 NOTE — PROGRESS NOTES
[facility]    Neurology Progress Note     Assessment & Plan   Bismark Marrero is a 81 year old male who was admitted on 10/21/2021.  Patient with parkinsonism as well as cognitive changes memory loss and behavior and hallucinations.  Admitted with hematuria.  The patient seems somewhat better today more awake but continues to be very impaired from his parkinsonism and cognitive deficiencies.    - We will continue carbidopa levodopa 3 times a day as before.  - Uses Seroquel on as needed basis, I will do very low-dose 12.5mg during the day to avoid sedation, only as needed.  - Avoid other antipsychotic neuroleptics  -The patient will benefit from TCU with occupational therapy and physical therapy.  -Please give the patient the address of [facility], [facility] in [place].  I will set up an appointment for follow-up pain 3 weeks or so.    Neurology will sign off if there are any questions or concerns please call us.    Yolande Flores MD    Interval History   Patient seems more awake today however still does not follow commands.  He only looks at his wife and does not interact.  He is very bradykinetic and muscle tone continues to be very increased.  Patient's wife reports episodes of hallucination.  She also states that the patient seems very tired today too.    Physical Exam   Temp: 97.5  F (36.4  C) Temp src: Axillary BP: 119/53 Pulse: 75   Resp: 16 SpO2: 99 % O2 Device: None (Room air)    Vitals:    10/22/21 0317   Weight: 76.2 kg (168 lb)     Vital Signs with Ranges  Temp:  [97.2  F (36.2  C)-98.3  F (36.8  C)] 97.5  F (36.4  C)  Pulse:  [56-90] 75  Resp:  [16-18] 16  BP: (119-137)/(53-71) 119/53  SpO2:  [90 %-99 %] 99 %  I/O last 3 completed shifts:  In: 580 [P.O.:580]  Out: 1025 [Urine:1025]      Medications       carbidopa-levodopa  1 tablet Oral TID     QUEtiapine  25 mg Oral BID       Data   Results for orders placed or performed during the hospital encounter of  10/21/21 (from the past 24 hour(s))   ABO/Rh type and screen    Narrative    The following orders were created for panel order ABO/Rh type and screen.  Procedure                               Abnormality         Status                     ---------                               -----------         ------                     Adult Type and Screen[[id number]]                            Final result                 Please view results for these tests on the individual orders.   Adult Type and Screen   Result Value Ref Range    ABO/RH(D) A NEG     Antibody Screen Negative Negative    SPECIMEN EXPIRATION DATE [id number]    Hemoglobin   Result Value Ref Range    Hemoglobin 9.9 (L) 13.3 - 17.7 g/dL   Hemoglobin   Result Value Ref Range    Hemoglobin 9.9 (L) 13.3 - 17.7 g/dL

## 2021-10-23 NOTE — PROGRESS NOTES
Contact precautions Maintained. Alert to self. Anxious, aggressive, swinging during cares. Up w/ assist 2, wheel chair bound at baseline. Minimal speech shift. Incontinent. T&R. Bladder irrigated every 4 hours with 60-90 ml of NS using a cath tipped syringe; Light yellow urine returned, not cots visualized. Brar output 450 ml. Urology following, no plans for surgical intervention at this time. Brar removal 10/24. PT/Neurology/Urology consults following. Discharge pending.

## 2021-10-23 NOTE — PROGRESS NOTES
10/23/21 1100   Quick Adds   Type of Visit Initial PT Evaluation       Present no   Living Environment   People in home spouse   Current Living Arrangements house   Home Accessibility stairs to enter home   Number of Stairs, Main Entrance 2   Stair Railings, Main Entrance railings on both sides of stairs   Transportation Anticipated family or friend will provide   Living Environment Comments Pt is a poor historian and follow-up will be needed to determine accuracy of responses. Pt lives with his spouse in a house. Pt reports needing to negotiate stairs to enter the home but once inside, all needs met on the main level. Pt's spouse will pick him up upon discharge and provide 24/7 assist if needed.   Self-Care   Usual Activity Tolerance moderate   Current Activity Tolerance poor   Regular Exercise No   Equipment Currently Used at Home walker, standard   Activity/Exercise/Self-Care Comment Pt reports needing assist with ADLs at baseline including cooking, cleaning, bathing, and dressing. Pt reports ambulating at baseline with a FWW. Pt reports stairs are difficult for him. Pt states he does not drive and wife assists with errand management.   Disability/Function   Hearing Difficulty or Deaf no   Wear Glasses or Blind no   Concentrating, Remembering or Making Decisions Difficulty yes   Difficulty Communicating yes   Walking or Climbing Stairs Difficulty yes   Walking or Climbing Stairs ambulation difficulty, requires equipment;stair climbing difficulty, requires equipment   Doing Errands Independently Difficulty (such as shopping) no   Fall history within last six months no   Change in Functional Status Since Onset of Current Illness/Injury yes   General Information   Onset of Illness/Injury or Date of Surgery 10/23/21   Referring Physician Joaquina Garner MD   Patient/Family Therapy Goals Statement (PT) Pt did not state   Pertinent History of Current Problem (include personal factors and/or  comorbidities that impact the POC) Per Chart: Bismark Marrero is a 81 year old male with HTN, history of prostate ca, chronic recurrent hematuria, parkinson's disease with dementia admitted on 10/21/2021 for gross hematuria.   Existing Precautions/Restrictions fall   Weight-Bearing Status - LLE full weight-bearing   Weight-Bearing Status - RLE full weight-bearing   Cognition   Orientation Status (Cognition) disoriented to;place;situation;time   Affect/Mental Status (Cognition) confused   Follows Commands (Cognition) follows one-step commands;25-49% accuracy   Pain Assessment   Patient Currently in Pain No   Integumentary/Edema   Integumentary/Edema no deficits were identifed   Posture    Posture Forward head position;Protracted shoulders   Range of Motion (ROM)   ROM Quick Adds ROM WFL   Strength   Manual Muscle Testing Quick Adds Able to perform R SLR;Able to perform L SLR;Strength WFL   Strength Comments R Hip Flexion: 3+/5; L Hip Flexion: 3/5   Bed Mobility   Comment (Bed Mobility) Supine>sit w/ max A x 1   Transfers   Transfer Safety Comments Sit>stand w/ FWW and max A x 2   Gait/Stairs (Locomotion)   Comment (Gait/Stairs) Unable to initiate   Balance   Balance Comments Pt able to sit at EOB unsupported without LOB. Pt unsteady in standing, requiring A x 2 to maintain balance.   Sensory Examination   Sensory Perception patient reports no sensory changes   Clinical Impression   Criteria for Skilled Therapeutic Intervention yes, treatment indicated   PT Diagnosis (PT) Impaired gait   Influenced by the following impairments Decreased activity tolerance; decreased balance; decreased strength   Functional limitations due to impairments Impaired functional mobility   Clinical Presentation Stable/Uncomplicated   Clinical Presentation Rationale Clinical Judgement   Clinical Decision Making (Complexity) low complexity   Therapy Frequency (PT) 5x/week   Predicted Duration of Therapy Intervention (days/wks) 7 days   Planned  Therapy Interventions (PT) balance training;bed mobility training;gait training;home exercise program;patient/family education;stair training;strengthening;transfer training   Risk & Benefits of therapy have been explained evaluation/treatment results reviewed;care plan/treatment goals reviewed;risks/benefits reviewed;current/potential barriers reviewed;participants voiced agreement with care plan;participants included;patient   PT Discharge Planning    PT Discharge Recommendation (DC Rec) Transitional Care Facility;home with home care physical therapy;home with assist   PT Rationale for DC Rec Pt is below baseline. Pt currently requiring A x 2 with all functional mobility. Pt unable to initiate gait at this time due to deficits in activity tolerance, balance, and strength. Due to these deficits, pt is a high falls risk and unsafe to return home. Pt would benefit from continued skilled PT services to address deficits and improve IND with safety and functional mobility. If pt were to return home, pt would require 24/7 A x 2 and a lift for all functional mobility.   PT Brief overview of current status  Supine>sit w/ max A x 1; sit>stand w/ FWW and max A x 2   Total Evaluation Time   Total Evaluation Time (Minutes) 10

## 2021-10-24 LAB — HGB BLD-MCNC: 9.1 G/DL (ref 13.3–17.7)

## 2021-10-24 PROCEDURE — 120N000004 HC R&B MS OVERFLOW

## 2021-10-24 PROCEDURE — 250N000013 HC RX MED GY IP 250 OP 250 PS 637: Performed by: HOSPITALIST

## 2021-10-24 PROCEDURE — 250N000013 HC RX MED GY IP 250 OP 250 PS 637: Performed by: INTERNAL MEDICINE

## 2021-10-24 PROCEDURE — 36415 COLL VENOUS BLD VENIPUNCTURE: CPT | Performed by: INTERNAL MEDICINE

## 2021-10-24 PROCEDURE — 85018 HEMOGLOBIN: CPT | Performed by: INTERNAL MEDICINE

## 2021-10-24 PROCEDURE — 99233 SBSQ HOSP IP/OBS HIGH 50: CPT | Performed by: INTERNAL MEDICINE

## 2021-10-24 RX ORDER — CEFUROXIME AXETIL 250 MG/1
250 TABLET ORAL EVERY 12 HOURS SCHEDULED
Status: DISCONTINUED | OUTPATIENT
Start: 2021-10-24 | End: 2021-10-24

## 2021-10-24 RX ORDER — CEFUROXIME AXETIL 500 MG/1
500 TABLET ORAL EVERY 12 HOURS SCHEDULED
Status: DISCONTINUED | OUTPATIENT
Start: 2021-10-24 | End: 2021-10-29 | Stop reason: HOSPADM

## 2021-10-24 RX ORDER — QUETIAPINE FUMARATE 25 MG/1
25 TABLET, FILM COATED ORAL AT BEDTIME
Status: DISCONTINUED | OUTPATIENT
Start: 2021-10-24 | End: 2021-10-26

## 2021-10-24 RX ADMIN — CARBIDOPA AND LEVODOPA 1 TABLET: 25; 100 TABLET ORAL at 21:08

## 2021-10-24 RX ADMIN — CARBIDOPA AND LEVODOPA 1 TABLET: 25; 100 TABLET ORAL at 12:40

## 2021-10-24 RX ADMIN — CEFUROXIME AXETIL 500 MG: 500 TABLET ORAL at 21:08

## 2021-10-24 RX ADMIN — QUETIAPINE FUMARATE 25 MG: 25 TABLET ORAL at 21:08

## 2021-10-24 RX ADMIN — QUETIAPINE 12.5 MG: 25 TABLET, FILM COATED ORAL at 08:05

## 2021-10-24 RX ADMIN — CARBIDOPA AND LEVODOPA 1 TABLET: 25; 100 TABLET ORAL at 15:37

## 2021-10-24 ASSESSMENT — ACTIVITIES OF DAILY LIVING (ADL)
ADLS_ACUITY_SCORE: 18
ADLS_ACUITY_SCORE: 16
ADLS_ACUITY_SCORE: 18
ADLS_ACUITY_SCORE: 16
ADLS_ACUITY_SCORE: 18
ADLS_ACUITY_SCORE: 18
ADLS_ACUITY_SCORE: 16
ADLS_ACUITY_SCORE: 16
ADLS_ACUITY_SCORE: 18
ADLS_ACUITY_SCORE: 16

## 2021-10-24 NOTE — PROGRESS NOTES
Care Management Follow Up    Length of Stay (days): 2    Expected Discharge Date: 10/24/2021     Concerns to be Addressed: discharge planning     Patient plan of care discussed at interdisciplinary rounds: Yes    Anticipated Discharge Disposition:       Anticipated Discharge Services:    Anticipated Discharge DME: [facility]    Patient/family educated on Medicare website which has current facility and service quality ratings: yes  Education Provided on the Discharge Plan:    Patient/Family in Agreement with the Plan:      Referrals Placed by CM/SW:    Private pay costs discussed: Not applicable    Additional Information:  Writer spoke with pts wife, who is agreeable with pt going to TCU. She stated she would prefer pt to go to [facility], she is agreeable to other referrals around [place]. She stated she does not want pt to return to [facility] and also does not want a referral to the [facility]. Referrals were sent. Pt is likely to need transport, as he has confusion and is not able to stand long.    Pts wife had also spoke about taking him home with private pay services. She would like to look for a TCU first, and if one cannot be found, then can look into private pay services.     MAR Lemon

## 2021-10-24 NOTE — PLAN OF CARE
Observation goals PRIOR TO DISCHARGE     Comments: -diagnostic tests and consults completed and resulted ,not met  -vital signs normal or at patient baseline ,met  -safe disposition plan has been identified ,not met  Pt alert, more confused as shift goes on. Vss. Tolerating diet, congested cough. Lungs with rhonchi, ibarra patent with light red tinged urine with few shreds, irrigated per order. Repositioned every two hours. Placement awaiting.            Nurse to notify provider when observation goals have been met and patient is ready for discharge.

## 2021-10-24 NOTE — PLAN OF CARE
Contact precautions maintained. Long arm. Alert to self. Aggressive, swinging during cares. Up w/ assist 2, w/ cares may require 3. Needs help w/ feeding. Incontinent. T&R. Brar removed, Voiding in brief. Checks PVRs, 0, 25 ml this shift. PT/SW/Neurology/Urology consults following. Discharge pending. TCU placement, referrals sent out.

## 2021-10-24 NOTE — PLAN OF CARE
AVSS; pt sleeping in between cares, but very agitated/combative with assessments, cares, bladder irrigation; 3 people needed for bladder irrigation; no clots noted in ibarra or with bladder irrigation; urine in tubing light pink; bladder irrigation done x 1 at about 0200; not repeated @ 0600 as pt with adequate urine output via ibarra; no clots returned for multiple irrigations, no clots noted in ibarra; urine in tubing light pink to clear; and pt very agitated/combative with 0200 irrigation; pt self discontinued IV on the pm shift; pt currently with no PIV; possible discontinuation of ibarra today; pt currently with mitts in place.

## 2021-10-24 NOTE — PROGRESS NOTES
UROLOGY    Urine clearing, no clot returned on manual irrigations. Patient is agitated and this is exacerbated by the Brar. I think that it'd be fine to remove the Brar this morning and see how he does. If he can void on his own, he could be discharged. Could use a week of oral antibiotics (choice per Medicine).     Would have a high thresh hold for placing the Brar back in. Only if he has PVRs over 500cc and complains of pelvic pressure with an inability to void.     Gavino Saleem MD

## 2021-10-24 NOTE — PROGRESS NOTES
[facility]    HOSPITALIST PROGRESS NOTE :   --------------------------------------------------    Date of Admission:  10/21/2021    Cumulative Summary: Bismark Marrero is a 81 year old male with HTN, history of prostate ca, chronic recurrent hematuria, parkinson's disease with dementia admitted on 10/21/2021 for gross hematuria.    Assessment & Plan     Magdiel hematuria with clots, associated with BPH, no UTI:  Acute blood loss anemia, mild  *Hgb relatively stable from previous. Hemodynamically stable. Hematuria started on Sunday but patient passed a large clot with a large amount of urine on the morning of admission .  *Patient wad admitted for further evaluation by urology   *Patient primary urologist is Dr. Saleem, last seen in 2019.  Cystoscopy at that time demonstrated large median lobe of prostate without any bladder tumors.  Patient has undergone TURP in 2016 with 41 g resected.  He has been on finasteride in the past which was discontinued sometime ago by his wife who was concerned that it was contributing to his neurological decline.  *Clinic note from January 2016 with Dr. Maza notes that patient had multiple prostate biopsies in the past with Dr. Wen, but currently urology is unable to find any documentation.  *PSA was up to 8.68 in October 2016.  * Per urology , there is concern for bleeding from prostate hyperplasia   * Patient is not on any home AC and is not receiving any chemical DVT prophylaxis at this time ,as urology is recommending to hold AC  * per Dr. Phelan from urology , Suspect origin of hematuria is from massive prostate  *CT scan of the abdomen and pelvis is done this morning , results are reviewed , showing Hyperdense material in the urinary bladder lumen is likely clot.Curvilinear calcification in the bladder neck is either in the  urinary bladder wall or in the median lobe of the prostate gland  Consider cystoscopy to exclude underlying lesions. Marked  prostatomegaly.    -- Patient was seen and examined, feeling better   -- tentative discharge later to TCU if ok with urology, plan to remove ibarra this morning   --Hemoglobin is checked this morning stable around 9.1.  --Plan to monitor post void residual, urology is recommending to place a Ibarra catheter back only if more than 500 mL residual is present.  --Urology is also recommending 7 days of antibiotic due to hematuria associated with BPH and high risk of infection, will start patient on Ceftin 100 mg p.o. twice daily for 7 days.  So far patient has not required any antibiotic as there was no sign and symptoms of urinary tract infection, patient is a started on antibiotics preemptively due to high risk urology recommendation.  --Expecting for patient agitation to improve after Ibarra catheter is removed.  --Continue regular diet.  -- Plan for Cysto as outpatient with Dr. Saleem to further assess underlying prostate/bladder pathology   -- Urology is recommending Finasteride, ut holding as patient's wife is not interested in resuming this medication as she is concerned it contributed to his development of Parkinson's and dementia in the past. Will let primary urologist discuss with family further   --Will recommend wife having further discussion with neurology as an outpatient in hospital follow-up if finasteride actually contributes to patient neurological symptoms   -- Recommend monitoring patient today for post void gel residual and plan for discharge tomorrow if continues to do well with voiding.  -- Discharge can be considered for later today if safe disposition planning is available    Urinary incontinence:   -- wears depends at night  -- current bladder care as above      Parkinson's disease  Dementia with behavioral disturbance: on PTA sinemet. There was some question of Lewy body dementia. No current follow up with neurology as patient's wife did not like follow up at [facility]. She gives sinemet  "intermittently and doesn't notice change with giving or not giving medication. Patient has been sundowning at home and wife provides all cares with assistance from son who lives nearby.    -- continue sinemet TID for now  -- started seroquel BID , seems to be doing well.  --Neurology is okay with continuing patient on low-dose Seroquel, will decrease the morning dose to 12.5 mg and will leave bedtime dose of 25 mg to help with agitation and behavioral disturbances.  --Neurology is also recommending to continue patient on current Sinemet dose.  --Of note patient has not been receiving it regularly at home as wife has not noticed a difference with its use.  --Neurology is recommending follow-up in the clinic in 3 weeks.     Generalized weakness  Bedbound  Malnutrition  Severely debilitated since hospitalization with COVID in April. He spent 3 months in a TCU/LTC facility but wife said she brought him home because he was put on the dementia unit and she felt care would be better at home. Since he has come home he has had progressing dementia and behavioral issues including sundowning. Wife has not talked to anyone about these issues and had many questions about what is \"normal.\" She does seem somewhat overwhelmed with cares. Patient needs assistance to ambulate, get up from chair. He spends most time sitting in a chair. Very agitated and confused in the ED. We did discuss medication for delirium and sundowning and patient's wife was okay with trying something.    -- Avoid Zyprexa, continued as needed order of Zyprexa  -- doing well with Seroquel, add as needed orders for agitation  -- PT/OT is ordered , appreciate  help in discussing the safe disposition plan with wife , at this time plan for patient to go to TCU as recommended by therapies  -- If No TCU is available in coming days then wife will consider taking him home with therapies   -- continue fall precautions     COVID 19 PCR came back negative . " Recovered COVID 4/21.    Clinically Significant Risk Factors Present on Admission             Diet: Regular Diet Adult    Brar Catheter: PRESENT, indication: Gross Hematuria  DVT Prophylaxis: Pneumatic Compression Devices, AC on hold   Code Status: Full Code , patient will benefit from further discussion in the presence of family by PCP regarding CODE STATUS considering his current advanced Parkinson disease and risk of worsening mental status in the case of cardiac or respiratory arrest.    The patient's care was discussed with the Bedside Nurse and Patient.    Disposition Plan   Expected discharge: None currently living at home with wife who is the primary caregiver.  Patient is recommended to be discharged to TCU at this time by therapies.  Care coordinators and social workers are posted.  35 minutes of the time was a spent on floor in direct patient care, more than 60% of the time was a spent in counseling and coordination of the care.  Entered: Almita Starkey MD 10/24/2021, 10:11 AM     Almita Starkey MD, FACP  Text Page (7am - 6pm)    ----------------------------------------------------------------------------------------------------------------------    Interval History    Patient was seen and examined, remains slightly confused as baseline but cooperative, unable to provide any history, discussed with him regarding plan for removing the Brar catheter later today if okay with urology.    -Data reviewed today: I reviewed all new labs and imaging results over the last 24 hours.    I personally reviewed no images or EKG's today.    Physical Exam   Temp: 97.8  F (36.6  C) Temp src: Oral BP: 121/64 Pulse: 83   Resp: 16 SpO2: 96 % O2 Device: None (Room air)    Vitals:    10/22/21 0317   Weight: 76.2 kg (168 lb)     Vital Signs with Ranges  Temp:  [97.5  F (36.4  C)-98  F (36.7  C)] 97.8  F (36.6  C)  Pulse:  [75-84] 83  Resp:  [16] 16  BP: (111-125)/(53-91) 121/64  SpO2:  [96 %-99 %] 96 %  I/O last 3 completed  shifts:  In: 1140 [P.O.:1140]  Out: 1000 [Urine:1000]    GENERAL: Alert , awake and oriented person only, NAD. Conversational, appropriate.   HEENT: Normocephalic. EOMI. No icterus or injection. Nares normal.   LUNGS: Clear to auscultation. No dyspnea at rest.   HEART: Regular rate. Extremities perfused.   ABDOMEN: Soft, nontender, and nondistended. Positive bowel sounds.   EXTREMITIES: No LE edema noted.   NEUROLOGIC: Moves extremities x4 on command. No acute focal neurologic abnormalities noted.     Medications       carbidopa-levodopa  1 tablet Oral TID     QUEtiapine  12.5 mg Oral QAM     QUEtiapine  25 mg Oral At Bedtime       Data   Recent Labs   Lab 10/24/21  0702 10/23/21  1854 10/23/21  0603 10/22/21  1826 10/22/21  0659 10/22/21  0009 10/21/21  1410   WBC  --   --   --   --  6.7  --  7.2   HGB 9.1* 9.7* 9.9*   < > 9.1*  9.1*   < > 11.0*   MCV  --   --   --   --  81  --  82   PLT  --   --   --   --  186  --  233   INR  --   --   --   --  1.11  --   --    NA  --   --   --   --  139  --  137   POTASSIUM  --   --   --   --  3.8  --  3.6   CHLORIDE  --   --   --   --  108  --  104   CO2  --   --   --   --  26  --  24   BUN  --   --   --   --  18  --  19   CR  --   --   --   --  0.91  --  0.86   ANIONGAP  --   --   --   --  5  --  9   UBALDO  --   --   --   --  8.8  --  8.9   GLC  --   --   --   --  94  --  199*   ALBUMIN  --   --   --   --   --   --  3.9   PROTTOTAL  --   --   --   --   --   --  7.4   BILITOTAL  --   --   --   --   --   --  0.6   ALKPHOS  --   --   --   --   --   --  74   ALT  --   --   --   --   --   --  12   AST  --   --   --   --   --   --  10    < > = values in this interval not displayed.       Imaging:   No results found for this or any previous visit (from the past 24 hour(s)).

## 2021-10-25 LAB — HGB BLD-MCNC: 9.5 G/DL (ref 13.3–17.7)

## 2021-10-25 PROCEDURE — 99232 SBSQ HOSP IP/OBS MODERATE 35: CPT | Performed by: INTERNAL MEDICINE

## 2021-10-25 PROCEDURE — 250N000013 HC RX MED GY IP 250 OP 250 PS 637: Performed by: INTERNAL MEDICINE

## 2021-10-25 PROCEDURE — 120N000004 HC R&B MS OVERFLOW

## 2021-10-25 PROCEDURE — 85018 HEMOGLOBIN: CPT | Performed by: INTERNAL MEDICINE

## 2021-10-25 PROCEDURE — 250N000013 HC RX MED GY IP 250 OP 250 PS 637: Performed by: HOSPITALIST

## 2021-10-25 PROCEDURE — 36415 COLL VENOUS BLD VENIPUNCTURE: CPT | Performed by: INTERNAL MEDICINE

## 2021-10-25 RX ADMIN — CEFUROXIME AXETIL 500 MG: 500 TABLET ORAL at 19:18

## 2021-10-25 RX ADMIN — CARBIDOPA AND LEVODOPA 1 TABLET: 25; 100 TABLET ORAL at 11:15

## 2021-10-25 RX ADMIN — CARBIDOPA AND LEVODOPA 1 TABLET: 25; 100 TABLET ORAL at 19:18

## 2021-10-25 RX ADMIN — QUETIAPINE FUMARATE 25 MG: 25 TABLET ORAL at 21:25

## 2021-10-25 RX ADMIN — QUETIAPINE 12.5 MG: 25 TABLET, FILM COATED ORAL at 08:22

## 2021-10-25 RX ADMIN — CEFUROXIME AXETIL 500 MG: 500 TABLET ORAL at 08:22

## 2021-10-25 RX ADMIN — CARBIDOPA AND LEVODOPA 1 TABLET: 25; 100 TABLET ORAL at 14:08

## 2021-10-25 ASSESSMENT — ACTIVITIES OF DAILY LIVING (ADL)
ADLS_ACUITY_SCORE: 20
ADLS_ACUITY_SCORE: 18
ADLS_ACUITY_SCORE: 22
ADLS_ACUITY_SCORE: 18
ADLS_ACUITY_SCORE: 22
ADLS_ACUITY_SCORE: 18
ADLS_ACUITY_SCORE: 18
ADLS_ACUITY_SCORE: 20
ADLS_ACUITY_SCORE: 22
ADLS_ACUITY_SCORE: 22
ADLS_ACUITY_SCORE: 18
ADLS_ACUITY_SCORE: 22
ADLS_ACUITY_SCORE: 20
ADLS_ACUITY_SCORE: 22
ADLS_ACUITY_SCORE: 22
ADLS_ACUITY_SCORE: 18
ADLS_ACUITY_SCORE: 22
ADLS_ACUITY_SCORE: 22
ADLS_ACUITY_SCORE: 20

## 2021-10-25 NOTE — PROGRESS NOTES
[facility]    Hospitalist Progress Note    Assessment & Plan   Bismark Marrero is a 81 year old male with PMHx of hypertension, Parkinson's disease with dementia, hx of prostate Ca and chronic recurrent hematuria who was admitted on 10/21/2021 for evaluation of gross hematuria.     Magdiel hematuria with clots, associated with BPH, no UTI: Improved  Acute blood loss anemia dt hematuria  Hx of urinary incontinence  *Follows with [facility] (Dr. Saleem), was last seen in 2019. Cystoscopy at that time showed a large median lobe of prostate without any bladder tumors. Had previously undergone TURP in 2016. He has been on finasteride in the past which was discontinued sometime ago by his wife who was concerned that it was contributing to his neurological decline   * Presented to ED for evaluation of gross hematuria that initially started on 10/17. Had passed a large clot with a large amount of urine on the morning of admission.  Not on chronic anticoagulation or ASA.  * In ED, was afebrile and hemodynamically stable. Hgb 11, which was stable from prior labs. No s/sx of infection  * Ibarra placed this stay.  * Seen by [facility] this stay. Suspect bleeding dt prostate hyperplasia.   * CT abd/pelvis done 10/22 showed:  1.  Hyperdense material in the urinary bladder lumen is likely clot.   Consider cystoscopy to exclude underlying lesions.   2.  Curvilinear calcification in the bladder neck is either in the   urinary bladder wall or in the median lobe of the prostate gland.   3.  Marked prostatomegaly.   4.  No renal calculi, hydronephrosis or renal masses evident on   noncontrast CT.      * Serial hgbs remain low but stable at 9 this stay.   * Urine clearing this stay, no clot has been observed with manual irritation of ibarra this stay.  * Ibarra catheter ultimately removed per urology on 10/24, recommended to replace ibarra only if he has PVRs >500.  * Urology recommended resumption of finasteride but  "patient's wife declined as she was concerned it previously contributed to development of Parkinsons/dementia sx.  -- cont to monitor PVRs  -- urology recommended 7d course of abx at discharge to high risk for infection -- placed on Ceftin 100mg BID on 10/25  -- hgbs remains table at 9  -- should ultimately follow up with urology in clinic after discharge     Parkinson's disease  Dementia with behavioral disturbance  * Some question as to whether he has Lewy Body dementia. Dose not currently follow with neurology as spouse did not like provider at [facility].   * Manges with Sinemet TID but wife reportedly only gives this to him intermittently as she did not notice any changes when he does get it.   * Typically has some sundowning at home. Gets assistance from wife and a son who lives nearby.   * General neurology consulted this stay.   -- cont Sinemet TID  -- was started on Seroquel BID this stay with good response -- cont 12.5mg AM and 25mg HS  -- recommended to follow up with neurology in clinic in 3 wks     Generalized weakness, bedbound at baseline  * Has remained severely debilitated since hospitalization with COVID in 4/2021. He spent 3 months in a TCU/LTC facility but wife said she brought him home because he was put on the dementia unit and she felt care would be better at home. Since he has come home he has had progressing dementia and behavioral issues including sundowning. Wife has not talked to anyone about these issues and had many questions about what is \"normal.\" She does seem somewhat overwhelmed with cares. Patient needs assistance to ambulate, get up from chair. He spends most time sitting in a chair. Very agitated and confused in the ED. Spouse was okay with use of medications for delirium this stay.   -- cont Seroquel as above, avoiding use of Zyprexa for now  -- PT/OT ordered, SW following and attempting to coordinate TCU stay  -- wife has said that if unable to find TCU bed in coming days, " she is considering taking him home with therapies    Suspected malnutrition  Albumin 3.9 this stay.   -- nutritionist consulted to help determine severity of suspected malnutrition and for recommendations regarding use of supplements    FEN: no IVFs, lytes stable, regular diet  DVT Prophylaxis: PCDs, oral anticoagulation on hold as above  Code Status: Full Code     Disposition: Anticipate discharge to TCU once placement found.     Chitra Brasher    Interval History   Uneventful night. Seen this morning. Patient sleeping but wife is at bedside. Remains stable. No reported cp/sob/cough, abd pain/n/v. Continues voiding well w/o ibarra.     -Data reviewed today: I reviewed all new labs and imaging results over the last 24 hours. I personally reviewed no images or EKG's today.    Physical Exam   Temp: 97.8  F (36.6  C) Temp src: Oral BP: 106/49 Pulse: 76   Resp: 16 SpO2: 94 % O2 Device: None (Room air)    Vitals:    10/22/21 0317 10/25/21 0700   Weight: 76.2 kg (168 lb) 75.6 kg (166 lb 11.2 oz)     Vital Signs with Ranges  Temp:  [97.8  F (36.6  C)-98.6  F (37  C)] 97.8  F (36.6  C)  Pulse:  [76-91] 76  Resp:  [16-20] 16  BP: (106-142)/(49-71) 106/49  SpO2:  [90 %-98 %] 94 %  I/O last 3 completed shifts:  In: 200 [P.O.:200]  Out: -     Constitutional: Resting comfortably, slept during my visit, NAD  Respiratory: CTAB, no wheeze/rales/rhonchi, no increased work of breathing  Cardiovascular: HRRR, no MGR, no LE edema  GI: S, NT, ND, +BS  Skin/Integumen: warm/dry  Other:      Medications       carbidopa-levodopa  1 tablet Oral TID     cefuroxime  500 mg Oral Q12H SHARRON     QUEtiapine  12.5 mg Oral QAM     QUEtiapine  25 mg Oral At Bedtime       Data   Recent Labs   Lab 10/25/21  0655 10/24/21  0702 10/23/21  1854 10/22/21  1826 10/22/21  0659 10/22/21  0009 10/21/21  1410   WBC  --   --   --   --  6.7  --  7.2   HGB 9.5* 9.1* 9.7*   < > 9.1*  9.1*   < > 11.0*   MCV  --   --   --   --  81  --  82   PLT  --   --   --    --  186  --  233   INR  --   --   --   --  1.11  --   --    NA  --   --   --   --  139  --  137   POTASSIUM  --   --   --   --  3.8  --  3.6   CHLORIDE  --   --   --   --  108  --  104   CO2  --   --   --   --  26  --  24   BUN  --   --   --   --  18  --  19   CR  --   --   --   --  0.91  --  0.86   ANIONGAP  --   --   --   --  5  --  9   UBALDO  --   --   --   --  8.8  --  8.9   GLC  --   --   --   --  94  --  199*   ALBUMIN  --   --   --   --   --   --  3.9   PROTTOTAL  --   --   --   --   --   --  7.4   BILITOTAL  --   --   --   --   --   --  0.6   ALKPHOS  --   --   --   --   --   --  74   ALT  --   --   --   --   --   --  12   AST  --   --   --   --   --   --  10    < > = values in this interval not displayed.       No results found for this or any previous visit (from the past 24 hour(s)).

## 2021-10-25 NOTE — PLAN OF CARE
AVSS; pt confused; combative at times with assessments/cares, but appeared to sleep well otherwise; pt incontinent of urine, urine dark/tea-colored, but no clots noted; turned/repositioned; plan for TCU placement.

## 2021-10-25 NOTE — PROGRESS NOTES
Care Management Follow Up    Length of Stay (days): 3    Expected Discharge Date: 10/26/2021     Concerns to be Addressed: discharge planning     Patient plan of care discussed at interdisciplinary rounds: Yes    Anticipated Discharge Disposition:       Anticipated Discharge Services:    Anticipated Discharge DME: [facility]    Patient/family educated on Medicare website which has current facility and service quality ratings: yes  Education Provided on the Discharge Plan:    Patient/Family in Agreement with the Plan:      Referrals Placed by CM/SW:    Private pay costs discussed:     Additional Information:  SW left voicemail for wife that we do not yet have a TCU bed for pt.      Alee Valladares, LICSW

## 2021-10-25 NOTE — PROGRESS NOTES
[facility]    Urology Progress Note     Assessment & Plan   Bismark Marrero is a 81 year old male who was admitted on 10/21/2021. Urology following for gross hematuria-- improving     Plan:   -doing well after ibarra removal yd  -cont to monitor voids and PVRs, per Dr. Saleem-- high threshold for replacing ibarra (only if PVR >500cc and complains of pelvic pressure/inability to void)   -follow hb     Urology will follow peripherally for now, please call with questions     Shelly León PA-C  [facility]   [email or web address]  Text Page (7:30am to 4:30pm)    Interval History   No events overnight   Ibarra now removed, seems to be voiding although no PVRs documented   Pt resting upon rounds this AM     AVSS  Hb 9.5, stable     Physical Exam   Temp: 97.8  F (36.6  C) Temp src: Oral BP: 106/49 Pulse: 76   Resp: 16 SpO2: 94 % O2 Device: None (Room air)    Vitals:    10/22/21 0317 10/25/21 0700   Weight: 76.2 kg (168 lb) 75.6 kg (166 lb 11.2 oz)     Vital Signs with Ranges  Temp:  [97.8  F (36.6  C)-98.6  F (37  C)] 97.8  F (36.6  C)  Pulse:  [76-91] 76  Resp:  [16-20] 16  BP: (106-142)/(49-71) 106/49  SpO2:  [90 %-98 %] 94 %  I/O last 3 completed shifts:  In: 200 [P.O.:200]  Out: -     Constitutional: pt resting comfortably sleeping, agitated upon waking   ENT: normocephalic   Respiratory: breathing unlabored   Cardiovascular: chest wall symmetric   GI: soft, non distended   Genitourinary: no suprapubic tenderness, ibarra removed   Skin: well perfused       Medications       carbidopa-levodopa  1 tablet Oral TID     cefuroxime  500 mg Oral Q12H SHARRON     QUEtiapine  12.5 mg Oral QAM     QUEtiapine  25 mg Oral At Bedtime       Data   Results for orders placed or performed during the hospital encounter of 10/21/21 (from the past 24 hour(s))   Hemoglobin   Result Value Ref Range    Hemoglobin 9.5 (L) 13.3 - 17.7 g/dL

## 2021-10-25 NOTE — PROVIDER NOTIFICATION
MD Notification    Notified Person: MD Mary figueredo    Notified Person Name:    Notification Date/Time:10/25/2021    Notification Interaction:web    Purpose of Notification:Pt not drinking, voided one time and choking on liquid    Orders Received: order for speech eval.    Comments:

## 2021-10-25 NOTE — CONSULTS
"CLINICAL NUTRITION SERVICES  -  ASSESSMENT NOTE      RECOMMENDATIONS FOR MD/PROVIDER TO ORDER:   Spouse concerned for dysphagia, chocking & requesting SLP eval p/t discharge    Recommendations Ordered by Registered Dietitian (RD):   Rhea Marin[employer] chocolate supplement    Malnutrition:   % Weight Loss:  Weight loss does not meet criteria for malnutrition - see wt trending   % Intake:  <75% for >/= 3 months (moderate malnutrition) - suspect on average  Subcutaneous Fat Loss:  Orbital region moderate depletion, Upper arm region mild-moderate depletion and Thoracic region mild depletion  Muscle Loss:  Temporal region moderate depletion, Clavicle bone region mild depletion, Acromion bone region mild depletion, Scapular bone region mild depletion, Patellar region mild depletion, Anterior thigh region moderate depletion and Posterior calf region moderate depletion  Fluid Retention:  None noted    Malnutrition Diagnosis: Moderate malnutrition  In Context of:  Chronic illness or disease        REASON FOR ASSESSMENT  Bismark Marrero is a 81 year old male seen by Registered Dietitian for Provider Order - malnutrition -- please assess severity       NUTRITION HISTORY  Chart reviewed and discussed with patients spouse (patient sleeping soundly)  Spouse reports that patient eat well at home -- consuming 2 meals and 1-2 snacks on a 12-15 hrs fasting cycle   First meal of the day ~11am = varies; yogurt, fruit, almonds, toast w/ sunbutter, sausage and eggs or oatmeal   Second meal in the PM = varies; veggie mix (no lettuce), meat or fish, sweet potato  Snack = small ice cream cone, dove chocolate   Pt also receives a \"glycoscience\" based herbal supplement and other supplements including: MCT, magnesium, omega fatty acids, lithium, probitoics and creatine     Spouse has noticed patient having increased phlegm production with yogurt and ice cream so has recently been limiting his intake of dairy   She states that he has had dysphagia " "in the past and required thickened liquids. Spouse thinks pt may be chocking on food more recently on a regular diet    She is concerned about his protein intake and overall muscle atrophy     PMH includes HTN, Parkinsons disease, dementia  Bedbound in the past 6 months   Previously hospitalized with COVID in April and required 3 months at TCU/LTC facility     CURRENT NUTRITION ORDERS  Diet Order:     Regular     Current Intake/Tolerance:  Took 3 adequate meals yesterday but has not had anything to eat yet today - late lunch just ordered p/t our visit  We discussed adding in an additional meal and/or protein/calorie supplement for additional nutrition intake and spouse interested in trying [employer] chocolate drink     NUTRITION FOCUSED PHYSICAL ASSESSMENT FOR DIAGNOSING MALNUTRITION)  Yes         Observed:    Muscle wasting (refer to documentation in Malnutrition section) and Subcutaneous fat loss (refer to documentation in Malnutrition section)    Obtained from Chart/Interdisciplinary Team:  None noted    ANTHROPOMETRICS  Height: 5' 7\"  Weight: 166 lbs 11.2 oz  Body mass index is 26.11 kg/m .  Weight Status:  Overweight BMI 25-29.9  IBW: 67.3 kg  % IBW: 112%  Weight History: No previous wts on file to evaluate.Spouse reports UBW ~170 lbs so potential for 4 lbs lost over unknown time period   Wt Readings from Last 10 Encounters:   10/25/21 75.6 kg (166 lb 11.2 oz)       LABS  Labs reviewed    MEDICATIONS  Medications reviewed      ASSESSED NUTRITION NEEDS PER APPROVED PRACTICE GUIDELINES:    Dosing Weight 75.6 kg   Estimated Energy Needs: 4887-3982+ kcals (20-25+ Kcal/Kg)  Justification: maintenance, bedbound  Estimated Protein Needs: 75-90+ grams protein (1-1.2+ g pro/Kg)  Justification: preservation of lean body mass, repletion   Estimated Fluid Needs: 1 mL/kcal or per MD     MALNUTRITION:  % Weight Loss:  Weight loss does not meet criteria for malnutrition - see wt trending   % Intake:  <75% for >/= 3 months " (moderate malnutrition) - suspect on average  Subcutaneous Fat Loss:  Orbital region moderate depletion, Upper arm region mild-moderate depletion and Thoracic region mild depletion  Muscle Loss:  Temporal region moderate depletion, Clavicle bone region mild depletion, Acromion bone region mild depletion, Scapular bone region mild depletion, Patellar region mild depletion, Anterior thigh region moderate depletion and Posterior calf region moderate depletion  Fluid Retention:  None noted    Malnutrition Diagnosis: Moderate malnutrition  In Context of:  Chronic illness or disease    NUTRITION DIAGNOSIS:  Malnutrition related to suspected sub-optimal calorie/protein intake 2/2 progression with Parkinsons and possible dysphagia as evidenced by fat and muscle depletion and intakes likely </=75% needs       NUTRITION INTERVENTIONS  Recommendations / Nutrition Prescription  Continue regular diet unless specified by SLP  Encourage meals TID +2 snacks with adequate protein intake  [employer] supplements    Implementation  Nutrition education: Per Provider order if indicated. Briefly reviewed tips for increasing calories and protein in the diet. Discussed the need for an additional meal and/or protein supplement to prevent further muscle breakdown. Discouraged additional fasting if able  General/healthful diet and Medical Food Supplement: as above       Nutrition Goals  Patient will consume >/=75% meals TID plus at least 1 supplement/day    MONITORING AND EVALUATION:  Progress towards goals will be monitored and evaluated per protocol and Practice Guidelines      Jessika Garner RD, LD  Clinical Dietitian   Unit pager [phone]

## 2021-10-25 NOTE — PLAN OF CARE
Pt is confused, JOSHUA A&O, VSS on RA expt fot soft BP, Pt is tired/sleepy and trouble staying awake, No urine out put during shift, Turn and repo q2hr, Bladder scan: 167, 211. Placement pending, continue to monitor

## 2021-10-26 ENCOUNTER — APPOINTMENT (OUTPATIENT)
Dept: PHYSICAL THERAPY | Facility: CLINIC | Age: 81
DRG: 726 | End: 2021-10-26
Payer: MEDICARE

## 2021-10-26 ENCOUNTER — APPOINTMENT (OUTPATIENT)
Dept: SPEECH THERAPY | Facility: CLINIC | Age: 81
DRG: 726 | End: 2021-10-26
Attending: INTERNAL MEDICINE
Payer: MEDICARE

## 2021-10-26 PROCEDURE — 92526 ORAL FUNCTION THERAPY: CPT | Mod: GN | Performed by: SPEECH-LANGUAGE PATHOLOGIST

## 2021-10-26 PROCEDURE — 250N000013 HC RX MED GY IP 250 OP 250 PS 637: Performed by: INTERNAL MEDICINE

## 2021-10-26 PROCEDURE — 250N000013 HC RX MED GY IP 250 OP 250 PS 637: Performed by: HOSPITALIST

## 2021-10-26 PROCEDURE — 120N000004 HC R&B MS OVERFLOW

## 2021-10-26 PROCEDURE — 92610 EVALUATE SWALLOWING FUNCTION: CPT | Mod: GN | Performed by: SPEECH-LANGUAGE PATHOLOGIST

## 2021-10-26 PROCEDURE — 97530 THERAPEUTIC ACTIVITIES: CPT | Mod: GP | Performed by: PHYSICAL THERAPIST

## 2021-10-26 PROCEDURE — 99232 SBSQ HOSP IP/OBS MODERATE 35: CPT | Performed by: INTERNAL MEDICINE

## 2021-10-26 RX ADMIN — CARBIDOPA AND LEVODOPA 1 TABLET: 25; 100 TABLET ORAL at 15:04

## 2021-10-26 RX ADMIN — QUETIAPINE 12.5 MG: 25 TABLET, FILM COATED ORAL at 22:45

## 2021-10-26 RX ADMIN — CEFUROXIME AXETIL 500 MG: 500 TABLET ORAL at 08:55

## 2021-10-26 RX ADMIN — CARBIDOPA AND LEVODOPA 1 TABLET: 25; 100 TABLET ORAL at 19:07

## 2021-10-26 RX ADMIN — CARBIDOPA AND LEVODOPA 1 TABLET: 25; 100 TABLET ORAL at 11:50

## 2021-10-26 RX ADMIN — CEFUROXIME AXETIL 500 MG: 500 TABLET ORAL at 19:07

## 2021-10-26 RX ADMIN — QUETIAPINE 12.5 MG: 25 TABLET, FILM COATED ORAL at 08:55

## 2021-10-26 ASSESSMENT — ACTIVITIES OF DAILY LIVING (ADL)
ADLS_ACUITY_SCORE: 24
ADLS_ACUITY_SCORE: 22
ADLS_ACUITY_SCORE: 20
ADLS_ACUITY_SCORE: 24
ADLS_ACUITY_SCORE: 22
ADLS_ACUITY_SCORE: 20
ADLS_ACUITY_SCORE: 24
ADLS_ACUITY_SCORE: 22
ADLS_ACUITY_SCORE: 20
ADLS_ACUITY_SCORE: 20
ADLS_ACUITY_SCORE: 22
ADLS_ACUITY_SCORE: 24
ADLS_ACUITY_SCORE: 24
ADLS_ACUITY_SCORE: 22
ADLS_ACUITY_SCORE: 22
ADLS_ACUITY_SCORE: 24
ADLS_ACUITY_SCORE: 22
ADLS_ACUITY_SCORE: 22
ADLS_ACUITY_SCORE: 20
ADLS_ACUITY_SCORE: 24

## 2021-10-26 NOTE — PLAN OF CARE
Pt is confused at times, Disoriented to time and situation, VSS on RA expt for HTN, Pt is tired/sleepy at times, Reg diet, Strong assist of 1-2 Assist w/ GB and walker, Turn and repo q2hr, Incontinent of B&BL, Ambulated to bathroom. Placement pending, continue to monitor

## 2021-10-26 NOTE — PLAN OF CARE
SLP: Orders received and chart reviewed. Speech consulted for choking/coughing on liquids Attempted a bedside swallow evaluation but he was to confused and lethargic for safe PO trials. If unable to return today will see in the am on 10/27/21.

## 2021-10-26 NOTE — PLAN OF CARE
Pt sleepy, arouses with stimulation latter part of shift restless and fustrated, refusing meds takes  a lot of  encouragement to cooperate. Incontinent of urine, last urine serosanquinous in color. Vss, repositioned,  awaiting placement.

## 2021-10-26 NOTE — PLAN OF CARE
AVSS; pt with no non-verbal indications of pain; following commands intermittently; occasionally resisting cares, but did well with explanations and encouragement; incontinent of stool x 1; no urine output overnight; bladder scanned for 128 mls; pt turned/repositioned; appeared to sleep well overnight.

## 2021-10-26 NOTE — PROVIDER NOTIFICATION
"MD Notification    Notified Person: MD    Notified Person Name: Dr. rBasher     Notification Date/Time: 10/26/2021 1123    Notification Interaction: Paged     Purpose of Notification: \"OBS 12, LZ FYI Pt speech consult wasn't able to complete due to lethargic. can pt evening Seroquel does be reduced? please advise\"      Orders Received: \"Reducing Dose\"    Comments:    "

## 2021-10-26 NOTE — PROGRESS NOTES
[facility]    Hospitalist Progress Note    Assessment & Plan   Bismark Marrero is a 81 year old male with PMHx of hypertension, Parkinson's disease with dementia, hx of prostate Ca and chronic recurrent hematuria who was admitted on 10/21/2021 for evaluation of gross hematuria.     Magdiel hematuria with clots, associated with BPH, no UTI: Improved  Acute blood loss anemia dt hematuria  Hx of urinary incontinence  *Follows with [facility] (Dr. Saleem), was last seen in 2019. Cystoscopy at that time showed a large median lobe of prostate without any bladder tumors. Had previously undergone TURP in 2016. He has been on finasteride in the past which was discontinued sometime ago by his wife who was concerned that it was contributing to his neurological decline   * Presented to ED for evaluation of gross hematuria that initially started on 10/17. Had passed a large clot with a large amount of urine on the morning of admission.  Not on chronic anticoagulation or ASA.  * In ED, was afebrile and hemodynamically stable. Hgb 11, which was stable from prior labs. No s/sx of infection  * Ibarra placed this stay.  * Seen by [facility] this stay. Suspect bleeding dt prostate hyperplasia.   * CT abd/pelvis done 10/22 showed:  1.  Hyperdense material in the urinary bladder lumen is likely clot.   Consider cystoscopy to exclude underlying lesions.   2.  Curvilinear calcification in the bladder neck is either in the   urinary bladder wall or in the median lobe of the prostate gland.   3.  Marked prostatomegaly.   4.  No renal calculi, hydronephrosis or renal masses evident on   noncontrast CT.      * Serial hgbs remain low but stable at 9 this stay.   * Urine clearing this stay, no clot has been observed with manual irritation of ibarra this stay.  * Ibarra catheter ultimately removed per urology on 10/24, recommended to replace ibarra only if he has PVRs >500.  * Urology recommended resumption of finasteride but  "patient's wife declined as she was concerned it previously contributed to development of Parkinsons/dementia sx.  -- cont to monitor PVRs -- have remained < 500  -- urology recommended 7d course of abx at discharge to high risk for infection -- placed on Ceftin 100mg BID on 10/25  -- hgbs remains table at 9  -- should ultimately follow up with urology in clinic after discharge     Parkinson's disease  Dementia with behavioral disturbance  * Some question as to whether he has Lewy Body dementia. Dose not currently follow with neurology as spouse did not like provider at [facility].   * Manges with Sinemet TID but wife reportedly only gives this to him intermittently as she did not notice any changes when he does get it.   * Typically has some sundowning at home. Gets assistance from wife and a son who lives nearby.   * General neurology consulted this stay.   * Noted to have some choking when taking sips of water on 10/25. SLP consulted  -- await SLP recs re dysphagia and need for diet modifications  -- cont Sinemet TID  -- was started on Seroquel BID this stay with good response -- cont 12.5mg AM, given AM/daytime sleepiness will decrease HS dose from 25mg to 12.5mg  -- recommended to follow up with neurology in clinic in 3 wks     Generalized weakness, bedbound at baseline  * Has remained severely debilitated since hospitalization with COVID in 4/2021. He spent 3 months in a TCU/LTC facility but wife said she brought him home because he was put on the dementia unit and she felt care would be better at home. Since he has come home he has had progressing dementia and behavioral issues including sundowning. Wife has not talked to anyone about these issues and had many questions about what is \"normal.\" She does seem somewhat overwhelmed with cares. Patient needs assistance to ambulate, get up from chair. He spends most time sitting in a chair. Very agitated and confused in the ED. Spouse was okay with use of " medications for delirium this stay.   -- cont Seroquel as above, avoiding use of Zyprexa for now  -- PT/OT ordered, SW following and attempting to coordinate TCU stay  -- wife has said that if unable to find TCU bed in coming days, she is considering taking him home with therapies    Moderate malnutrition in context of chronic illness  Albumin 3.9 this stay. Seen by nutritionist. Diagnosed with moderate malnutrition in the context of chronic illness  -- ongoing recs per nutritionsist    FEN: no IVFs, lytes stable, regular diet  DVT Prophylaxis: PCDs, oral anticoagulation on hold as above  Code Status: Full Code     Disposition: Anticipate discharge to TCU once placement found.     Chitra Brasher    Interval History    Some concerns per RN yesterday that patient choked on water. Uneventful night. Was sleeping quietly this morning when I stopped by. Easily roused to name. Oriented x3 but +situational confusion. No cp/sob/cough, abd pain/n/v. Continues voiding well w/o ibarra. PVRs remain low.     -Data reviewed today: I reviewed all new labs and imaging results over the last 24 hours. I personally reviewed no images or EKG's today.    Physical Exam   Temp: 97.4  F (36.3  C) Temp src: Axillary BP: (!) 146/76 Pulse: 74   Resp: 16 SpO2: 96 % O2 Device: None (Room air)    Vitals:    10/22/21 0317 10/25/21 0700   Weight: 76.2 kg (168 lb) 75.6 kg (166 lb 11.2 oz)     Vital Signs with Ranges  Temp:  [97.4  F (36.3  C)-98.5  F (36.9  C)] 97.4  F (36.3  C)  Pulse:  [74-85] 74  Resp:  [15-20] 16  BP: (108-146)/(46-76) 146/76  SpO2:  [95 %-96 %] 96 %  I/O last 3 completed shifts:  In: 125 [P.O.:125]  Out: -     Constitutional: Resting comfortably, easily rouses to name, oriented x3 but with +situational confusion, NAD  Respiratory: CTAB, no wheeze/rales/rhonchi, no increased work of breathing  Cardiovascular: HRRR, no MGR, no LE edema  GI: S, NT, ND, +BS  Skin/Integumen: warm/dry  Other:      Medications        carbidopa-levodopa  1 tablet Oral TID     cefuroxime  500 mg Oral Q12H SHARRON     QUEtiapine  12.5 mg Oral QAM     QUEtiapine  25 mg Oral At Bedtime       Data   Recent Labs   Lab 10/25/21  0655 10/24/21  0702 10/23/21  1854 10/22/21  1826 10/22/21  0659 10/22/21  0009 10/21/21  1410   WBC  --   --   --   --  6.7  --  7.2   HGB 9.5* 9.1* 9.7*   < > 9.1*  9.1*   < > 11.0*   MCV  --   --   --   --  81  --  82   PLT  --   --   --   --  186  --  233   INR  --   --   --   --  1.11  --   --    NA  --   --   --   --  139  --  137   POTASSIUM  --   --   --   --  3.8  --  3.6   CHLORIDE  --   --   --   --  108  --  104   CO2  --   --   --   --  26  --  24   BUN  --   --   --   --  18  --  19   CR  --   --   --   --  0.91  --  0.86   ANIONGAP  --   --   --   --  5  --  9   UBALDO  --   --   --   --  8.8  --  8.9   GLC  --   --   --   --  94  --  199*   ALBUMIN  --   --   --   --   --   --  3.9   PROTTOTAL  --   --   --   --   --   --  7.4   BILITOTAL  --   --   --   --   --   --  0.6   ALKPHOS  --   --   --   --   --   --  74   ALT  --   --   --   --   --   --  12   AST  --   --   --   --   --   --  10    < > = values in this interval not displayed.       No results found for this or any previous visit (from the past 24 hour(s)).

## 2021-10-26 NOTE — PROGRESS NOTES
10/26/21 1236   General Information   Onset of Illness/Injury or Date of Surgery 10/21/21   Referring Physician Dr. Brasher   Patient/Family Therapy Goal Statement (SLP) Patient did not state.    Pertinent History of Current Problem Bismark Marrero is a 81 year old male with PMHx of hypertension, Parkinson's disease with dementia, hx of prostate Ca and chronic recurrent hematuria who was admitted on 10/21/2021 for evaluation of gross hematuria.    Past History of Dysphagia No documented history found.    Type of Evaluation   Type of Evaluation Swallow Evaluation   Oral Motor   Oral Musculature unable to assess due to poor participation/comprehension   Mucosal Quality adequate   Dentition (Oral Motor)   Dentition (Oral Motor) adequate dentition   General Swallowing Observations   Current Diet/Method of Nutritional Intake (General Swallowing Observations, NIS) regular diet;thin liquids (level 0)   Respiratory Support (General Swallowing Observations) none   Swallowing Evaluation Clinical swallow evaluation   Clinical Swallow Evaluation   Feeding Assistance minimal assistance required   Clinical Swallow Evaluation Textures Trialed thin liquids;mildly thick liquids;pureed;solid foods   Clinical Swallow Eval: Thin Liquid Texture Trial   Mode of Presentation, Thin Liquids cup;straw;self-fed   Volume of Liquid or Food Presented 4 oz of water   Oral Phase of Swallow Premature pharyngeal entry   Pharyngeal Phase of Swallow impaired;reduction in laryngeal movement;repeated swallows;wet vocal quality after swallow   Diagnostic Statement Increased wet vocal quality via the straw. Intermittent via the cup.    Clinical Swallow Eval: Mildly Thick Liquids   Mode of Presentation cup;self-fed   Volume Presented 5 swallows   Oral Phase premature pharyngeal entry   Pharyngeal Phase impaired;reduction in laryngeal movement;wet vocal quality after swallow   Diagnostic Statement No difference between thin and mildly thick. Wet vocal  quality.    Clinical Swallow Evaluation: Puree Solid Texture Trial   Mode of Presentation, Puree spoon;self-fed   Volume of Puree Presented 3 teaspoons   Oral Phase, Puree WFL   Pharyngeal Phase, Puree intact   Clinical Swallow Evaluation: Solid Food Texture Trial   Mode of Presentation spoon;self-fed   Volume Presented Pancakes, eggs and a Milky way.    Oral Phase WFL   Pharyngeal Phase impaired;repeated swallows;wet vocal quality after swallow   Diagnostic Statement He reported a globus sensation with the Milky Way candy bar that cleared with a liquid rinse.    Swallowing Recommendations   Diet Consistency Recommendations easy to chew (level 7);thin liquids (level 0)   Supervision Level for Intake close supervision needed   Mode of Delivery Recommendations bolus size, small;food moistened;no straws;slow rate of intake   Swallowing Maneuver Recommendations alternate food and liquid intake   Monitoring/Assistance Required (Eating/Swallowing) check mouth frequently for oral residue/pocketing;stop eating activities when fatigue is present;monitor for cough or change in vocal quality with intake   Recommended Feeding/Eating Techniques (Swallow Eval) maintain upright sitting position for eating;maintain upright posture during/after eating for 30 minutes;minimize distractions during oral intake;moisten oral mucosa prior to intake;provide assist with feeding;set-up and prepare tray   Medication Administration Recommendations, Swallowing (SLP) Whole as tolerated    Instrumental Assessment Recommendations VFSS (videofluroscopic swallowing study)   Comment, Swallowing Recommendations Patient may need a video swallow study if he continues to have difficulty with thin liquids.    General Therapy Interventions   Planned Therapy Interventions Dysphagia Treatment   Dysphagia treatment Modified diet education;Instruction of safe swallow strategies   SLP Therapy Assessment/Plan   Criteria for Skilled Therapeutic Interventions Met  (SLP Eval) yes   SLP Diagnosis Mild to moderate oral and pharyngeal dysphagia   Rehab Potential (SLP Eval) fair, will monitor progress closely   Therapy Frequency (SLP Eval) 5 times/wk   Predicted Duration of Therapy Intervention (SLP Eval) 1 week   Comment, Therapy Assessment/Plan (SLP) Patient presents with mild to moderate oral and pharyngeal dysphagia at bedside. Patient was confused but more alert and feeding himself a late breakfast. He demonstrated premature entry with thin liquids and can not rule out penetration via the straw. Improved control via the cup. Intermittent wet vocal quality noted. He continued to have intermittent wet vocal quality with mildly thick liquids. He was able to sufficiently masticate solids with good bolus extraction and clearing. No overt Sx of aspiration. He did state a mild globus sensation with the candy bar, but it cleared with a liquid rinse. Recommend; 1. Downgrade diet to easy to chew foods and continue with thin liquids. 2. supervision/assistance as needed, No straws, small bites/sips, alternate liquids/solids every few bites. If he continues to demonstrate Sx of aspiration will consider a video swallow study.    Therapy Plan Review/Discharge Plan (SLP)   Therapy Plan Review (SLP) evaluation/treatment results reviewed;care plan/treatment goals reviewed;risks/benefits reviewed;current/potential barriers reviewed;participants voiced agreement with care plan;participants included;patient   SLP Discharge Planning    SLP Discharge Recommendation (DC Rec) Transitional Care Facility   SLP Rationale for DC Rec Patient is below his baseline swallow function.    SLP Brief overview of current status  Patient with mild to moderate oral and pharyngeal dysphagia. Recommend; 1. Downgrade diet to easy to chew foods and continue with thin liquids. 2. supervision/assistance as needed, No straws, small bites/sips, alternate liquids/solids every few bites. If he continues to demonstrate Sx of  aspiration will consider a video swallow study.     Total Evaluation Time   Total Evaluation Time (Minutes) 20

## 2021-10-27 ENCOUNTER — APPOINTMENT (OUTPATIENT)
Dept: PHYSICAL THERAPY | Facility: CLINIC | Age: 81
DRG: 726 | End: 2021-10-27
Payer: MEDICARE

## 2021-10-27 ENCOUNTER — APPOINTMENT (OUTPATIENT)
Dept: SPEECH THERAPY | Facility: CLINIC | Age: 81
DRG: 726 | End: 2021-10-27
Payer: MEDICARE

## 2021-10-27 PROCEDURE — 92526 ORAL FUNCTION THERAPY: CPT | Mod: GN | Performed by: SPEECH-LANGUAGE PATHOLOGIST

## 2021-10-27 PROCEDURE — 97116 GAIT TRAINING THERAPY: CPT | Mod: GP

## 2021-10-27 PROCEDURE — 250N000013 HC RX MED GY IP 250 OP 250 PS 637: Performed by: HOSPITALIST

## 2021-10-27 PROCEDURE — 97530 THERAPEUTIC ACTIVITIES: CPT | Mod: GP

## 2021-10-27 PROCEDURE — 97110 THERAPEUTIC EXERCISES: CPT | Mod: GP

## 2021-10-27 PROCEDURE — 250N000013 HC RX MED GY IP 250 OP 250 PS 637: Performed by: INTERNAL MEDICINE

## 2021-10-27 PROCEDURE — 99232 SBSQ HOSP IP/OBS MODERATE 35: CPT | Performed by: INTERNAL MEDICINE

## 2021-10-27 PROCEDURE — 120N000004 HC R&B MS OVERFLOW

## 2021-10-27 RX ADMIN — CEFUROXIME AXETIL 500 MG: 500 TABLET ORAL at 08:22

## 2021-10-27 RX ADMIN — CEFUROXIME AXETIL 500 MG: 500 TABLET ORAL at 20:17

## 2021-10-27 RX ADMIN — CARBIDOPA AND LEVODOPA 1 TABLET: 25; 100 TABLET ORAL at 20:17

## 2021-10-27 RX ADMIN — CARBIDOPA AND LEVODOPA 1 TABLET: 25; 100 TABLET ORAL at 16:37

## 2021-10-27 RX ADMIN — CARBIDOPA AND LEVODOPA 1 TABLET: 25; 100 TABLET ORAL at 11:25

## 2021-10-27 RX ADMIN — QUETIAPINE 12.5 MG: 25 TABLET, FILM COATED ORAL at 08:22

## 2021-10-27 RX ADMIN — QUETIAPINE 12.5 MG: 25 TABLET, FILM COATED ORAL at 22:36

## 2021-10-27 ASSESSMENT — ACTIVITIES OF DAILY LIVING (ADL)
ADLS_ACUITY_SCORE: 24
ADLS_ACUITY_SCORE: 25
ADLS_ACUITY_SCORE: 24
ADLS_ACUITY_SCORE: 25
ADLS_ACUITY_SCORE: 25
ADLS_ACUITY_SCORE: 24
ADLS_ACUITY_SCORE: 25
ADLS_ACUITY_SCORE: 24
ADLS_ACUITY_SCORE: 24
ADLS_ACUITY_SCORE: 25
ADLS_ACUITY_SCORE: 25
ADLS_ACUITY_SCORE: 24
ADLS_ACUITY_SCORE: 24
ADLS_ACUITY_SCORE: 25
ADLS_ACUITY_SCORE: 24
ADLS_ACUITY_SCORE: 25

## 2021-10-27 NOTE — PROGRESS NOTES
[facility]    Hospitalist Progress Note    Assessment & Plan   Bismark Marrero is a 81 year old male with PMHx of hypertension, Parkinson's disease with dementia, hx of prostate Ca and chronic recurrent hematuria who was admitted on 10/21/2021 for evaluation of gross hematuria.     Magdiel hematuria with clots, associated with BPH, no UTI: Improved  Acute blood loss anemia dt hematuria  Hx of urinary incontinence  *Follows with [facility] (Dr. Saleem), was last seen in 2019. Cystoscopy at that time showed a large median lobe of prostate without any bladder tumors. Had previously undergone TURP in 2016. He has been on finasteride in the past which was discontinued sometime ago by his wife who was concerned that it was contributing to his neurological decline   * Presented to ED for evaluation of gross hematuria that initially started on 10/17. Had passed a large clot with a large amount of urine on the morning of admission.  Not on chronic anticoagulation or ASA.  * In ED, was afebrile and hemodynamically stable. Hgb 11, which was stable from prior labs. No s/sx of infection  * Ibarra placed this stay.  * Seen by [facility] this stay. Suspect bleeding dt prostate hyperplasia.   * CT abd/pelvis done 10/22 showed:  1.  Hyperdense material in the urinary bladder lumen is likely clot.   Consider cystoscopy to exclude underlying lesions.   2.  Curvilinear calcification in the bladder neck is either in the   urinary bladder wall or in the median lobe of the prostate gland.   3.  Marked prostatomegaly.   4.  No renal calculi, hydronephrosis or renal masses evident on   noncontrast CT.      * Serial hgbs remain low but stable at 9 this stay.   * Urine clearing this stay, no clot has been observed with manual irritation of ibarra this stay.  * Ibarra catheter ultimately removed per urology on 10/24, recommended to replace ibarra only if he has PVRs >500.  * Urology recommended resumption of finasteride but  "patient's wife declined as she was concerned it previously contributed to development of Parkinsons/dementia sx.  -- cont to monitor PVRs -- have remained < 500  -- urology recommended 7d course of abx at discharge to high risk for infection -- placed on Ceftin 100mg BID on 10/25  -- hgbs remains table at 9  -- should ultimately follow up with urology in clinic after discharge     Parkinson's disease  Dementia with behavioral disturbance  * Some question as to whether he has Lewy Body dementia. Dose not currently follow with neurology as spouse did not like provider at [facility].   * Manges with Sinemet TID but wife reportedly only gives this to him intermittently as she did not notice any changes when he does get it.   * Typically has some sundowning at home. Gets assistance from wife and a son who lives nearby.   * General neurology consulted this stay.   * Noted to have some choking when taking sips of water on 10/25. SLP consulted  -- await SLP recs re dysphagia and need for diet modifications  -- cont Sinemet TID  -- was started on Seroquel BID this stay with good response -- cont 12.5mg AM, given AM/daytime sleepiness noted this stay, HS dose was decreased from 25mg to 12.5mg on 10/26 with good response, cont same  -- recommended to follow up with neurology in clinic in 3 wks     Generalized weakness, bedbound at baseline  * Has remained severely debilitated since hospitalization with COVID in 4/2021. He spent 3 months in a TCU/LTC facility but wife said she brought him home because he was put on the dementia unit and she felt care would be better at home. Since he has come home he has had progressing dementia and behavioral issues including sundowning. Wife has not talked to anyone about these issues and had many questions about what is \"normal.\" She does seem somewhat overwhelmed with cares. Patient needs assistance to ambulate, get up from chair. He spends most time sitting in a chair. Very agitated and " confused in the ED. Spouse was okay with use of medications for delirium this stay.   -- cont Seroquel as above, avoiding use of Zyprexa for now  -- PT/OT ordered, SW following and attempting to coordinate TCU stay  -- wife has said that if unable to find TCU bed in coming days, she is considering taking him home with therapies    Moderate malnutrition in context of chronic illness  Albumin 3.9 this stay. Seen by nutritionist. Diagnosed with moderate malnutrition in the context of chronic illness  -- ongoing recs per nutritionsist    FEN: no IVFs, lytes stable, regular diet  DVT Prophylaxis: PCDs, oral anticoagulation on hold as above  Code Status: Full Code     Disposition: Medically stable to discharge. Today's SW notes reviewed -- discharge to TCU vs home with home care, pending wife's preference. Anticipate discharge tomorrow.     Wife at bedside. Questions answered. She will discuss with SW in AM    Chitra Brasher    Interval History    Seen this afternoon. Alert and sitting up in chair. Just finished working work therapy, note gait remains quite unsteady. Patient alert, oriented to self. No specific complaints. Denies cp/sob/cough, abd pain/n/v. Continues voiding well w/o ibarra. PVRs remain low.     -Data reviewed today: I reviewed all new labs and imaging results over the last 24 hours. I personally reviewed no images or EKG's today.    Physical Exam   Temp: 98.4  F (36.9  C) Temp src: Oral BP: 137/77 Pulse: 81   Resp: 18 SpO2: 95 % O2 Device: None (Room air)    Vitals:    10/22/21 0317 10/25/21 0700   Weight: 76.2 kg (168 lb) 75.6 kg (166 lb 11.2 oz)     Vital Signs with Ranges  Temp:  [97.3  F (36.3  C)-98.4  F (36.9  C)] 98.4  F (36.9  C)  Pulse:  [65-81] 81  Resp:  [16-18] 18  BP: (125-154)/(61-85) 137/77  SpO2:  [92 %-100 %] 95 %  I/O last 3 completed shifts:  In: 500 [P.O.:500]  Out: -     Constitutional: Resting comfortably in chair, oriented x3 but with +situational confusion,  NAD  Respiratory: CTAB, no wheeze/rales/rhonchi, no increased work of breathing  Cardiovascular: HRRR, no MGR, no LE edema  GI: S, NT, ND, +BS  Skin/Integumen: warm/dry  Other:      Medications       carbidopa-levodopa  1 tablet Oral TID     cefuroxime  500 mg Oral Q12H SHARRON     QUEtiapine  12.5 mg Oral At Bedtime     QUEtiapine  12.5 mg Oral QAM       Data   Recent Labs   Lab 10/25/21  0655 10/24/21  0702 10/23/21  1854 10/22/21  1826 10/22/21  0659 10/22/21  0009 10/21/21  1410   WBC  --   --   --   --  6.7  --  7.2   HGB 9.5* 9.1* 9.7*   < > 9.1*  9.1*   < > 11.0*   MCV  --   --   --   --  81  --  82   PLT  --   --   --   --  186  --  233   INR  --   --   --   --  1.11  --   --    NA  --   --   --   --  139  --  137   POTASSIUM  --   --   --   --  3.8  --  3.6   CHLORIDE  --   --   --   --  108  --  104   CO2  --   --   --   --  26  --  24   BUN  --   --   --   --  18  --  19   CR  --   --   --   --  0.91  --  0.86   ANIONGAP  --   --   --   --  5  --  9   UBALDO  --   --   --   --  8.8  --  8.9   GLC  --   --   --   --  94  --  199*   ALBUMIN  --   --   --   --   --   --  3.9   PROTTOTAL  --   --   --   --   --   --  7.4   BILITOTAL  --   --   --   --   --   --  0.6   ALKPHOS  --   --   --   --   --   --  74   ALT  --   --   --   --   --   --  12   AST  --   --   --   --   --   --  10    < > = values in this interval not displayed.       No results found for this or any previous visit (from the past 24 hour(s)).

## 2021-10-27 NOTE — PROGRESS NOTES
Care Management Follow Up    Length of Stay (days): 5    Expected Discharge Date: 10/27/2021     Concerns to be Addressed: discharge planning     Patient plan of care discussed at interdisciplinary rounds: Yes    Anticipated Discharge Disposition:       Anticipated Discharge Services:    Anticipated Discharge DME: [facility]    Patient/family educated on Medicare website which has current facility and service quality ratings: yes  Education Provided on the Discharge Plan:    Patient/Family in Agreement with the Plan:      Referrals Placed by CM/SW:    Private pay costs discussed: Not applicable    Additional Information:  Writer received VM from [facility] stating that they are unable to accept patient. Writer called and spoke to patient's wife regarding TCU placement and discussed additional options for placement including other memory care units. Patient's wife stated that she is not in favor of other memory care unit due to distance and would prefer to hear from [facility] before moving forward as patient has been there twice before. Patient's wife identified that she would consider doing private pay services and then homecare if [facility] cannot accept patient. Patient's wife stated that prior to the hospital she was helping patient and using a transport chair, has grab bars in the home and raised toilet seat. Writer and patient's wife agreed to see what [facility] says and then determine discharge plans from there.       Stephanie Freeman, VIRGIE, LGSW   Social Work   [facility]

## 2021-10-27 NOTE — PROGRESS NOTES
Care Management Follow Up    Length of Stay (days): 5    Expected Discharge Date: 10/27/2021     Concerns to be Addressed: discharge planning     Patient plan of care discussed at interdisciplinary rounds: Yes    Anticipated Discharge Disposition:       Anticipated Discharge Services:    Anticipated Discharge DME: [facility]    Patient/family educated on Medicare website which has current facility and service quality ratings: yes  Education Provided on the Discharge Plan:    Patient/Family in Agreement with the Plan:      Referrals Placed by CM/SW:    Private pay costs discussed: Not applicable    Additional Information:  Writer received a call from Maren at [facility] stating that due to patient not being vaccinated he would have to quarantine for two weeks with no visitation on the TCU floor and then if he was still there would be moved to the memory care floor where he could have scheduled visitation. Writer stated she would speak with the wife and update Maren after speaking to the wife. Writer called and left  for patient's wife regarding the visitation limitation and asked if she would like to proceed with placement. Waiting for a call back.     Stephanie Freeman, VIRGIE, LGSW   Social Work   [facility]

## 2021-10-27 NOTE — PROGRESS NOTES
Pt A&Ox2. VSS on RA. Denies pain. Tolerating diet(easy to chew and thin liquids). Agitated with changing once.Turn and repo. Up with A1/2/GB/walker. Incontinent of BM and bladder. TCU placement pending.

## 2021-10-27 NOTE — PLAN OF CARE
Contact precautions for MRSA. AxOx3, confused at times. VSS on RA, hypertensive at times. A1 GB and walker, long arm sit. Fall precautions. Regular diet. Incontinent of bowel and bladder. Turn and repo. Denies pain. Hemoglobin 9.5. PT/speech/urology/neuro following. Waiting for TCU placement. Continue to monitor.

## 2021-10-27 NOTE — PLAN OF CARE
A/O x2, disoriented to time and situation. VSS on RA. Adriano Reg: easy to chew/ thin liquid diet. Behavioral disturbance, long-arm in place. A1-2/GB/walker. Turn-repo. Incontinent of BM & bladder. Plan: TCU placement pending.

## 2021-10-28 ENCOUNTER — APPOINTMENT (OUTPATIENT)
Dept: SPEECH THERAPY | Facility: CLINIC | Age: 81
DRG: 726 | End: 2021-10-28
Payer: MEDICARE

## 2021-10-28 PROCEDURE — 250N000013 HC RX MED GY IP 250 OP 250 PS 637: Performed by: INTERNAL MEDICINE

## 2021-10-28 PROCEDURE — 99232 SBSQ HOSP IP/OBS MODERATE 35: CPT | Performed by: HOSPITALIST

## 2021-10-28 PROCEDURE — 92526 ORAL FUNCTION THERAPY: CPT | Mod: GN

## 2021-10-28 PROCEDURE — 250N000013 HC RX MED GY IP 250 OP 250 PS 637: Performed by: HOSPITALIST

## 2021-10-28 PROCEDURE — 120N000004 HC R&B MS OVERFLOW

## 2021-10-28 RX ADMIN — CEFUROXIME AXETIL 500 MG: 500 TABLET ORAL at 08:02

## 2021-10-28 RX ADMIN — CARBIDOPA AND LEVODOPA 1 TABLET: 25; 100 TABLET ORAL at 19:05

## 2021-10-28 RX ADMIN — QUETIAPINE 12.5 MG: 25 TABLET, FILM COATED ORAL at 08:03

## 2021-10-28 RX ADMIN — QUETIAPINE 12.5 MG: 25 TABLET, FILM COATED ORAL at 21:01

## 2021-10-28 RX ADMIN — CEFUROXIME AXETIL 500 MG: 500 TABLET ORAL at 20:53

## 2021-10-28 RX ADMIN — CARBIDOPA AND LEVODOPA 1 TABLET: 25; 100 TABLET ORAL at 15:10

## 2021-10-28 RX ADMIN — CARBIDOPA AND LEVODOPA 1 TABLET: 25; 100 TABLET ORAL at 11:11

## 2021-10-28 ASSESSMENT — ACTIVITIES OF DAILY LIVING (ADL)
ADLS_ACUITY_SCORE: 24
ADLS_ACUITY_SCORE: 25
ADLS_ACUITY_SCORE: 24
ADLS_ACUITY_SCORE: 25
ADLS_ACUITY_SCORE: 24

## 2021-10-28 NOTE — PROGRESS NOTES
Care Management Follow Up    Length of Stay (days): 6    Expected Discharge Date: 10/28/2021     Concerns to be Addressed: discharge planning     Patient plan of care discussed at interdisciplinary rounds: Yes    Anticipated Discharge Disposition: Home Care     Anticipated Discharge Services:    Anticipated Discharge DME: [facility]    Patient/family educated on Medicare website which has current facility and service quality ratings: yes  Education Provided on the Discharge Plan:    Patient/Family in Agreement with the Plan: yes    Referrals Placed by CM/SW:    Private pay costs discussed: Not applicable    Additional Information:  Home Health referral sent and voicemail left with into at [facility] per family request-awaiting a call back from intake to accept.      Virginia Solis RN Care Coordinator

## 2021-10-28 NOTE — PROGRESS NOTES
Care Management Follow Up    Length of Stay (days): 6    Expected Discharge Date: 10/28/2021     Concerns to be Addressed: discharge planning     Patient plan of care discussed at interdisciplinary rounds: Yes    Anticipated Discharge Disposition: Home Care     Anticipated Discharge Services:    Anticipated Discharge DME: [facility]    Patient/family educated on Medicare website which has current facility and service quality ratings: yes  Education Provided on the Discharge Plan:    Patient/Family in Agreement with the Plan: yes    Referrals Placed by CM/SW:    Private pay costs discussed: Not applicable    Additional Information:  Met with pt's wife, Annmarie. Discussed home vs TCU. Pt would need to quarantine at TCU due to not being vaccinated for Covid. Annmarie is not in favor of this due to pt's dementia. She has discussed it with her sons. Therefore, she will take pt home. She has found a mechanical lift on [employer] and plans to buy it. Pt had one on the past from [employer], but it was to large for the space in their home. She will hire a private duty caregiver to help her at home. SW provided phone numbers for agencies and encouraged her to start calling right away to find out availability and pricing. She would also like home PT. Pt has used [facility] in the past and would like to use them again. Bed side nurse updated.       VIRGIE Barraza, LGSW  [phone]  [facility]

## 2021-10-28 NOTE — PROGRESS NOTES
[facility]    Hospitalist Progress Note    Assessment & Plan   Bismark Marrero is a 81 year old male with PMHx of hypertension, Parkinson's disease with dementia, hx of prostate Ca and chronic recurrent hematuria who was admitted on 10/21/2021 for evaluation of gross hematuria.     Magdiel hematuria with clots, associated with BPH, no UTI: Improved  Acute blood loss anemia dt hematuria  Hx of urinary incontinence  *Follows with [facility] (Dr. Saleem), was last seen in 2019. Cystoscopy at that time showed a large median lobe of prostate without any bladder tumors. Had previously undergone TURP in 2016. He has been on finasteride in the past which was discontinued sometime ago by his wife who was concerned that it was contributing to his neurological decline   * Presented to ED for evaluation of gross hematuria that initially started on 10/17. Had passed a large clot with a large amount of urine on the morning of admission.  Not on chronic anticoagulation or ASA.  * In ED, was afebrile and hemodynamically stable. Hgb 11, which was stable from prior labs. No s/sx of infection  * Ibarra placed this stay.  * Seen by [facility] this stay. Suspect bleeding dt prostate hyperplasia.   * CT abd/pelvis done 10/22 showed:  1.  Hyperdense material in the urinary bladder lumen is likely clot.   Consider cystoscopy to exclude underlying lesions.   2.  Curvilinear calcification in the bladder neck is either in the   urinary bladder wall or in the median lobe of the prostate gland.   3.  Marked prostatomegaly.   4.  No renal calculi, hydronephrosis or renal masses evident on   noncontrast CT.      * Serial hgbs remain low but stable at 9 this stay.   * Urine clearing this stay, no clot has been observed with manual irritation of ibarra this stay.  * Ibarra catheter ultimately removed per urology on 10/24, recommended to replace ibarra only if he has PVRs >500.  * Urology recommended resumption of finasteride but  "patient's wife declined as she was concerned it previously contributed to development of Parkinsons/dementia sx.  -- cont to monitor PVRs -- have remained < 500  -- urology recommended 7d course of abx at discharge to high risk for infection -- placed on Ceftin 100mg BID on 10/25  -- hgbs remains table at 9  -- should ultimately follow up with urology in clinic after discharge     Parkinson's disease  Dementia with behavioral disturbance  * Some question as to whether he has Lewy Body dementia. Dose not currently follow with neurology as spouse did not like provider at [facility].   * Manges with Sinemet TID but wife reportedly only gives this to him intermittently as she did not notice any changes when he does get it.   * Typically has some sundowning at home. Gets assistance from wife and a son who lives nearby.   * General neurology consulted this stay.   * Noted to have some choking when taking sips of water on 10/25. SLP consulted  -- continue diet as per SLP  -- cont Sinemet TID  -- continue Seroquel 12.5 mg BID -- (HS dose decreased due to daytime sleepiness)  -- recommended to follow up with neurology in clinic in 3 wks     Generalized weakness, bedbound at baseline  * Has remained severely debilitated since hospitalization with COVID in 4/2021. He spent 3 months in a TCU/LTC facility but wife said she brought him home because he was put on the dementia unit and she felt care would be better at home. Since he has come home he has had progressing dementia and behavioral issues including sundowning. Wife has not talked to anyone about these issues and had many questions about what is \"normal.\" She does seem somewhat overwhelmed with cares. Patient needs assistance to ambulate, get up from chair. He spends most time sitting in a chair. Very agitated and confused in the ED. Spouse was okay with use of medications for delirium this stay.   -- cont Seroquel as above, avoiding use of Zyprexa for now  -- PT/OT " ordered, ARCHIE following and attempting to coordinate TCU stay  -- wife has elected to return home with home services as TCU would require a 2-week quarantine    Moderate malnutrition in context of chronic illness  Albumin 3.9 this stay. Seen by nutritionist. Diagnosed with moderate malnutrition in the context of chronic illness  -- ongoing recs per nutritionist    FEN: no IVFs, lytes stable, regular diet  DVT Prophylaxis: PCDs, oral anticoagulation on hold as above  Code Status: Full Code     Disposition: Medically stable to discharge. Discussed with SW, wife has elected to return home with services.       Alexander Nadegewilian    Interval History    He is not interactive and does not respond to questions this AM, though RN reporting no issues with agitation.    Wife updated at bedside this afternoon, she has elected to return home with services.     -Data reviewed today: I reviewed all new labs and imaging results over the last 24 hours. I personally reviewed no images or EKG's today.    Physical Exam   Temp: 98.1  F (36.7  C) Temp src: Oral BP: 133/71 Pulse: 80   Resp: 18 SpO2: 98 % O2 Device: None (Room air)    Vitals:    10/22/21 0317 10/25/21 0700   Weight: 76.2 kg (168 lb) 75.6 kg (166 lb 11.2 oz)     Vital Signs with Ranges  Temp:  [97.4  F (36.3  C)-98.7  F (37.1  C)] 98.1  F (36.7  C)  Pulse:  [80-92] 80  Resp:  [18] 18  BP: (133-145)/(71-86) 133/71  SpO2:  [95 %-98 %] 98 %  I/O last 3 completed shifts:  In: 200 [P.O.:200]  Out: -     Constitutional: Resting comfortably in bed, does not respond to questions  Respiratory: CTAB, no wheeze/rales/rhonchi, no increased work of breathing  Cardiovascular: HRRR, no MGR, no LE edema  GI: S, NT, ND, +BS  Skin/Integumen: warm/dry  Other:  no gross cranial nerve deficits    Medications       carbidopa-levodopa  1 tablet Oral TID     cefuroxime  500 mg Oral Q12H SHARRON     QUEtiapine  12.5 mg Oral At Bedtime     QUEtiapine  12.5 mg Oral QAM       Data   Recent Labs   Lab  10/25/21  0655 10/24/21  0702 10/23/21  1854 10/22/21  1826 10/22/21  0659   WBC  --   --   --   --  6.7   HGB 9.5* 9.1* 9.7*   < > 9.1*  9.1*   MCV  --   --   --   --  81   PLT  --   --   --   --  186   INR  --   --   --   --  1.11   NA  --   --   --   --  139   POTASSIUM  --   --   --   --  3.8   CHLORIDE  --   --   --   --  108   CO2  --   --   --   --  26   BUN  --   --   --   --  18   CR  --   --   --   --  0.91   ANIONGAP  --   --   --   --  5   UBALDO  --   --   --   --  8.8   GLC  --   --   --   --  94    < > = values in this interval not displayed.       No results found for this or any previous visit (from the past 24 hour(s)).

## 2021-10-28 NOTE — PLAN OF CARE
Contact precautions for MRSA. AO to self only, agitated at times. VSS on RA ex HTN. PAINAD 2. A2 GB and walker, T/R, not oob this shift.  Level 7 easy to chew, thin liquid diet. Incontinent.  PT/speech/urology/neuro following. Waiting for TCU placement. Continue to monitor.

## 2021-10-28 NOTE — PLAN OF CARE
[id number]: Pt A&Ox2. VSS on RA. No s/s discomfort. Tolerating easy to chew and thin liquid diet. Cooperative w/ cares. Turn and repo q 2hrs when in bed. Up to chair for supper Ax2 GB and walker. Excellent appetite for supper. Seemed to enjoy having his wife at bedside, pt more alert and interactive. Incontinent of stool and bladder. SW and CC following; plan to discharge home w/ wife w/ home services (please see SW note).

## 2021-10-28 NOTE — PROGRESS NOTES
Pt A&O x2. VSS on RA. Denies pain. Tolerating diet ( easy to chew and thin liquids). Agitated with changing only once otherwise cooperate with care. Turn and repo. Up with assist of 2, GB, and walker. Incontinent of stool and bladder. TCU placement pending/possible discharge to home. SW following.

## 2021-10-28 NOTE — PLAN OF CARE
Contact precautions for MRSA. AxOx1, confused at times. VSS on RA, hypertensive at times. A2 GB and walker, long arm sit. Fall precautions. Easy to chew  And thin liquid diet. Incontinent of bowel and bladder. Turn and repo. Denies pain. Hemoglobin 9.5. PT/speech following. Waiting for TCU placement. Continue to monitor.

## 2021-10-29 ENCOUNTER — APPOINTMENT (OUTPATIENT)
Dept: PHYSICAL THERAPY | Facility: CLINIC | Age: 81
DRG: 726 | End: 2021-10-29
Payer: MEDICARE

## 2021-10-29 VITALS
RESPIRATION RATE: 18 BRPM | HEART RATE: 64 BPM | BODY MASS INDEX: 26.16 KG/M2 | SYSTOLIC BLOOD PRESSURE: 111 MMHG | TEMPERATURE: 97.7 F | OXYGEN SATURATION: 90 % | WEIGHT: 166.7 LBS | HEIGHT: 67 IN | DIASTOLIC BLOOD PRESSURE: 41 MMHG

## 2021-10-29 LAB — SARS-COV-2 RNA RESP QL NAA+PROBE: NEGATIVE

## 2021-10-29 PROCEDURE — 250N000013 HC RX MED GY IP 250 OP 250 PS 637: Performed by: INTERNAL MEDICINE

## 2021-10-29 PROCEDURE — 250N000013 HC RX MED GY IP 250 OP 250 PS 637: Performed by: HOSPITALIST

## 2021-10-29 PROCEDURE — 99239 HOSP IP/OBS DSCHRG MGMT >30: CPT | Performed by: HOSPITALIST

## 2021-10-29 PROCEDURE — 97530 THERAPEUTIC ACTIVITIES: CPT | Mod: GP

## 2021-10-29 PROCEDURE — 87635 SARS-COV-2 COVID-19 AMP PRB: CPT | Performed by: HOSPITALIST

## 2021-10-29 RX ORDER — CEFUROXIME AXETIL 500 MG/1
500 TABLET ORAL EVERY 12 HOURS
Qty: 4 TABLET | Refills: 0 | Status: SHIPPED | OUTPATIENT
Start: 2021-10-29 | End: 2021-10-31

## 2021-10-29 RX ORDER — QUETIAPINE FUMARATE 25 MG/1
12.5 TABLET, FILM COATED ORAL 2 TIMES DAILY
Qty: 30 TABLET | Refills: 0 | Status: SHIPPED | OUTPATIENT
Start: 2021-10-29

## 2021-10-29 RX ADMIN — CEFUROXIME AXETIL 500 MG: 500 TABLET ORAL at 08:49

## 2021-10-29 RX ADMIN — CARBIDOPA AND LEVODOPA 1 TABLET: 25; 100 TABLET ORAL at 11:11

## 2021-10-29 RX ADMIN — CARBIDOPA AND LEVODOPA 1 TABLET: 25; 100 TABLET ORAL at 15:18

## 2021-10-29 RX ADMIN — QUETIAPINE 12.5 MG: 25 TABLET, FILM COATED ORAL at 08:49

## 2021-10-29 ASSESSMENT — ACTIVITIES OF DAILY LIVING (ADL)
ADLS_ACUITY_SCORE: 24

## 2021-10-29 NOTE — PLAN OF CARE
Contact precautions maintained. Alert and Oriented  X2. VSS on Room Air. AX2 GB and walker. T&R Q2. Level 7 easy to chew, thin liquid diet. Incontinent bowel and bladder. -BM. Mepilex applied, sacrum area. HGB (9.5) is low but stable.  Medically stable to discharge, wife has elected to return home with services. SW following. Continue to monitor.     Patient refused 0600 AM blood draw. Will endorse to AM nurse.

## 2021-10-29 NOTE — PLAN OF CARE
Patient has been discharged October 29, 2021. Discharge instructions and education reviewed with wife, medication schedule and follow up appts discussed. Pt had no questions. All belongings sent with pt. Discharge medications were filled and sent with pt. Pt educated on medications. Transported by [employer].

## 2021-10-29 NOTE — PROGRESS NOTES
Care Management Discharge Note    Discharge Date: 10/29/2021       Discharge Disposition: Home Care    Discharge Services:      Discharge DME: Walker    Discharge Transportation: family or friend will provide    Private pay costs discussed: transportation costs    PAS Confirmation Code:    Patient/family educated on Medicare website which has current facility and service quality ratings: yes    Education Provided on the Discharge Plan:    Persons Notified of Discharge Plans: primary nurse and MD  Patient/Family in Agreement with the Plan: yes    Handoff Referral Completed: No    Additional Information: Home Health referral accepted by [facility], orders faxed.          Virginia Solis RN Care Coordinator

## 2021-10-29 NOTE — PROGRESS NOTES
Pt A&Ox2, drowsy this morning/afternoon. VSS on RA. No s/s of discomfort. Tolerating easy to chew and thin liquids diet. Turn and repo q2hrs. Attempted to transfer to chair using assist x2, GB, walker unsuccessful due to pt not being able to follow commands. Covid results: negative.Incontinent of stool and bladder. Plan to discharge home w/ wife w/ home services. Transport arranged 10/29 @ 5pm.

## 2021-10-29 NOTE — PLAN OF CARE
Contact precautions maintained. Alert and Oriented  X2. VSS on Room Air. AX2 GB and walker. T&R. Level 7 easy to chew, thin liquid diet. Incontinent. Mepilex applied, sacrum area.  Medically stable to discharge, wife has elected to return home with services. SW following. Continue to monitor.

## 2021-10-29 NOTE — DISCHARGE SUMMARY
[facility]  Hospitalist Discharge Summary      Date of Admission:  10/21/2021  Date of Discharge:  10/29/2021  Discharging Provider: Alexander Brooks MD      Discharge Diagnoses   Gross hematuria  Acute blood loss anemia  Hx urinary incontinence  Parkinson's disease  Dementia with behavioral disturbance  Generalized weakness  Moderate malnutrition    Follow-ups Needed After Discharge   Follow-up Appointments     Follow Up ([facility])      Please call to schedule a follow up appointment in the urology clinic   with Dr. Saleem in 1-2 weeks.     [address] [place] [address]. [address]  You may call [phone] with any questions or concerns.   Central Appointment #: [phone]         Follow-up and recommended labs and tests       Follow up with primary care provider, [facility],   within 7 days for hospital follow- up.  No follow up labs or test are   needed.  Follow up with [facility] in about 3 weeks.           Discharge Disposition   Discharged to home  Condition at discharge: Stable    Hospital Course   Bismark Marrero is a 81 year old male with PMHx of hypertension, Parkinson's disease with dementia, hx of prostate Ca and chronic recurrent hematuria who was admitted on 10/21/2021 for evaluation of gross hematuria.     Magdiel hematuria with clots, associated with BPH, no UTI: Improved  Acute blood loss anemia dt hematuria  Hx of urinary incontinence  *Follows with [facility] (Dr. Saleem), was last seen in 2019. Cystoscopy at that time showed a large median lobe of prostate without any bladder tumors. Had previously undergone TURP in 2016. He has been on finasteride in the past which was discontinued sometime ago by his wife who was concerned that it was contributing to his neurological decline.  Presented to ED for evaluation of gross hematuria with passage of large clots that initially started on 10/17.  Urology was consulted and felt bleeding  likely due to prostate hyperplasia, recommended resumption of finasteride but wife declined.  CT showed hyperdense material in the bladder which is likely clot as well as marked prostatomegaly.  Serial hgb trended down from 11 but stabilized about 9 d/gL.  Follow up with Urology as outpatient.      Parkinson's disease  Dementia with behavioral disturbance  Some question as to whether he has Lewy Body dementia. Dose not currently follow with neurology as spouse did not like provider at [facility].  Neurology consulted, recommend continuing Sinemet tid and follow up with neurology clinic in 3 weeks.  He was initiated on Seroquel 12.5 mg bid for agitated behaviors with good response.     Generalized weakness, bedbound at baseline  Has remained severely debilitated since hospitalization with COVID in 4/2021. He spent 3 months in a TCU/LTC facility but wife said she brought him home because he was put on the dementia unit and she felt care would be better at home. Therapies continue to recommend TCU but this would have required a 2-week quarantine due to lack of COVID vaccination.  Wife preferred to bring him home with home cares.     Moderate malnutrition in context of chronic illness  Seen by nutritionist. Had <75% oral intake for >3 months, has subcutaneous fat and muscle loss on exam.     Consultations This Hospital Stay   CARE MANAGEMENT / SOCIAL WORK IP CONSULT  PHYSICAL THERAPY ADULT IP CONSULT  OCCUPATIONAL THERAPY ADULT IP CONSULT  NEUROLOGY IP CONSULT  NEUROSURGERY IP CONSULT  NUTRITION SERVICES ADULT IP CONSULT  SPEECH LANGUAGE PATH ADULT IP CONSULT    Code Status   Full Code    Time Spent on this Encounter   I, Alexander Brooks MD, personally saw the patient today and spent greater than 30 minutes discharging this patient.       Alexander Brooks MD  [facility] EXTENDED RECOVERY AND SHORT STAY  [address] [facility] [address]  Phone:  [phone]  ______________________________________________________________________    Physical Exam   Vital Signs: Temp: 97.7  F (36.5  C) Temp src: Axillary BP: 111/41 Pulse: 64   Resp: 18 SpO2: 90 % O2 Device: None (Room air)    Weight: 166 lbs 11.2 oz  General Appearance: Well nourished elderly male in NAD  Respiratory: lungs CTAB, no wheezes or crackles, no tachypnea   Cardiovascular: RRR, normal s1/s2 without murmur  GI: abdomen soft, normal bowel sounds  Skin: no peripheral edema   Other: Alert, does not respond to questions, no gross cranial nerve deficits        Primary Care Physician   [facility]    Discharge Orders      Home care nursing referral      Home Care PT Referral for Hospital Discharge      Home Care OT Referral for Hospital Discharge      Follow Up ([facility])    Please call to schedule a follow up appointment in the urology clinic with Dr. Saleem in 1-2 weeks.     [address] [place] [address]. [address]  You may call [phone] with any questions or concerns.   Central Appointment #: [phone]     Reason for your hospital stay    You were admitted for blood clots in the urine which is likely due to an enlarged prostate.     Follow-up and recommended labs and tests     Follow up with primary care provider, [facility], within 7 days for hospital follow- up.  No follow up labs or test are needed.  Follow up with [facility] in about 3 weeks.     Activity    Your activity upon discharge: activity as tolerated     MD face to face encounter    Documentation of Face to Face and Certification for Home Health Services    I certify that patient: Bismark Marrero is under my care and that I, or a nurse practitioner or physician's assistant working with me, had a face-to-face encounter that meets the physician face-to-face encounter requirements with this patient on: 10/29/2021.    This encounter with the patient was in whole, or in  part, for the following medical condition, which is the primary reason for home health care: gross hematuria.    I certify that, based on my findings, the following services are medically necessary home health services: Nursing, Occupational Therapy, and Physical Therapy.    My clinical findings support the need for the above services because: Nurse is needed: To assess mental status/agitation after changes in medications or other medical regimen.., Occupational Therapy Services are needed to assess and treat cognitive ability and address ADL safety due to impairment in strength., and Physical Therapy Services are needed to assess and treat the following functional impairments: strength.    Further, I certify that my clinical findings support that this patient is homebound (i.e. absences from home require considerable and taxing effort and are for medical reasons or [employer] services or infrequently or of short duration when for other reasons) because: Requires assistance of another person or specialized equipment to access medical services because patient: Is unable to operate assistive equipment on their own. and Requires supervision of another for safe transfer...    Based on the above findings. I certify that this patient is confined to the home and needs intermittent skilled nursing care, physical therapy and/or speech therapy.  The patient is under my care, and I have initiated the establishment of the plan of care.  This patient will be followed by a physician who will periodically review the plan of care.  Physician/Provider to provide follow up care: [facility] -    [facility] physician (the Medicare certified [place] provider): Alexander Brooks MD  Physician Signature: See electronic signature associated with these discharge orders.  Date: 10/29/2021     Diet    Follow this diet upon discharge:       Snacks/Supplements Adult: Precious Marinelli Standard Oral Supplement; With Meals       Combination Diet Easy to Chew (level 7); Thin Liquids (level 0) (No straws)       Significant Results and Procedures   Most Recent 3 CBC's:Recent Labs   Lab Test 10/25/21  0655 10/24/21  0702 10/23/21  1854 10/22/21  1826 10/22/21  0659 10/22/21  0009 10/21/21  1410   WBC  --   --   --   --  6.7  --  7.2   HGB 9.5* 9.1* 9.7*   < > 9.1*  9.1*   < > 11.0*   MCV  --   --   --   --  81  --  82   PLT  --   --   --   --  186  --  233    < > = values in this interval not displayed.     Most Recent 3 BMP's:Recent Labs   Lab Test 10/22/21  0659 10/21/21  1410    137   POTASSIUM 3.8 3.6   CHLORIDE 108 104   CO2 26 24   BUN 18 19   CR 0.91 0.86   ANIONGAP 5 9   UBALDO 8.8 8.9   GLC 94 199*     Most Recent 3 Hemoglobins:Recent Labs   Lab Test 10/25/21  0655 10/24/21  0702 10/23/21  1854   HGB 9.5* 9.1* 9.7*   ,   Results for orders placed or performed during the hospital encounter of 10/21/21   CT Abdomen Pelvis w/o Contrast    Narrative    CT ABDOMEN PELVIS W/O CONTRAST 10/22/2021 9:20 AM    CLINICAL HISTORY: Hematuria, unknown cause  TECHNIQUE: CT scan of the abdomen and pelvis was performed without IV  contrast. Multiplanar reformats were obtained. Dose reduction  techniques were used.  CONTRAST: None.    COMPARISON: None.    FINDINGS:   LOWER CHEST: Mild bibasilar atelectasis/scarring. Small hiatal hernia.    HEPATOBILIARY: Normal.    PANCREAS: Fatty atrophy of the pancreatic parenchyma in the head and  body.    SPLEEN: Few punctate calcified granulomas.    ADRENAL GLANDS: Normal.    KIDNEYS/BLADDER: No urinary system calculi, hydronephrosis or  perinephritic stranding. No renal masses evident on this noncontrast  CT. Hyperdense material within the urinary bladder lumen is  indeterminate (series 3, image 152), likely clot within the urinary  bladder. Curvilinear calcification in the bladder neck measuring about  3.0 x 0.9 cm (series 3, image 156), either in the urinary bladder wall  or median lobe of the prostate  gland.    BOWEL: Normal caliber loops of small bowel and colon. Umbilical hernia  containing a nonobstructed segment of small bowel. Normal appendix.  Large amount of formed stool throughout the colon and rectum.    LYMPH NODES: No lymphadenopathy.    VASCULATURE: No abdominal aortic aneurysm.    PELVIC ORGANS: Marked prostatomegaly with the prostate gland measuring  7.6 x 6.3 x 9.4 cm    OTHER: No free fluid.    MUSCULOSKELETAL: Degenerative changes in the spine. No suspicious  lesions in the bones. Lumbar scoliosis.      Impression    IMPRESSION:   1.  Hyperdense material in the urinary bladder lumen is likely clot.  Consider cystoscopy to exclude underlying lesions.  2.  Curvilinear calcification in the bladder neck is either in the  urinary bladder wall or in the median lobe of the prostate gland.  3.  Marked prostatomegaly.  4.  No renal calculi, hydronephrosis or renal masses evident on  noncontrast CT.    BEBETO RODARTE MD         SYSTEM ID:  [id number]       Discharge Medications   Current Discharge Medication List      START taking these medications    Details   cefuroxime (CEFTIN) 500 MG tablet Take 1 tablet (500 mg) by mouth every 12 hours for 2 days  Qty: 4 tablet, Refills: 0    Associated Diagnoses: Gross hematuria      QUEtiapine (SEROQUEL) 25 MG tablet Take 0.5 tablets (12.5 mg) by mouth 2 times daily Morning and bedtime  Qty: 30 tablet, Refills: 0    Associated Diagnoses: Acute metabolic encephalopathy         CONTINUE these medications which have NOT CHANGED    Details   carbidopa-levodopa (SINEMET)  MG tablet Take 1 tablet by mouth 3 times daily At 11 am, 3 pm, 7 pm      docusate sodium (COLACE) 100 MG capsule Take 100 mg by mouth daily as needed for constipation      melatonin 3 MG tablet Take 1 mg by mouth nightly as needed for sleep      UNABLE TO FIND MEDICATION NAME: Neutracuticals for brain and immune system health      Vitamin D3 (CHOLECALCIFEROL) 125 MCG (5000 UT) tablet Take 1 tablet by  mouth daily           Allergies   No Known Allergies

## 2021-10-29 NOTE — PROGRESS NOTES
"CLINICAL NUTRITION SERVICES - REASSESSMENT NOTE    Recommendations Ordered by Registered Dietitian (RD):   Continue current interventions -     Diet per SLP  Encourage meals TID +2 snacks with adequate protein intake  [employer] supplements   Malnutrition: 10/25  % Weight Loss:  Weight loss does not meet criteria for malnutrition - see wt trending   % Intake:  <75% for >/= 3 months (moderate malnutrition) - suspect on average  Subcutaneous Fat Loss:  Orbital region moderate depletion, Upper arm region mild-moderate depletion and Thoracic region mild depletion  Muscle Loss:  Temporal region moderate depletion, Clavicle bone region mild depletion, Acromion bone region mild depletion, Scapular bone region mild depletion, Patellar region mild depletion, Anterior thigh region moderate depletion and Posterior calf region moderate depletion  Fluid Retention:  None noted     Malnutrition Diagnosis: Moderate malnutrition  In Context of:  Chronic illness or disease     EVALUATION OF PROGRESS TOWARD GOALS   Diet: Easy to Chew (IDDSI Level 7) + Thin liquids  Intake/Tolerance:   - Pt seen in room, soundly sleeping. Did not wake. He is eating quite well over recent days, yesterday RN even noted \"excellent appetite\" - seems to do well when his spouse is with him.   - Flowsheets show % intakes. He receives three adequate meals daily.     - Last BM yesterday x1    - SLP assessment on 10/26 - mild to moderate dysphagia.     ASSESSED NUTRITION NEEDS:  Dosing Weight 75.6 kg   Estimated Energy Needs: 8664-9727+ kcals (20-25+ Kcal/Kg)  Justification: maintenance, bedbound  Estimated Protein Needs: 75-90+ grams protein (1-1.2+ g pro/Kg)  Justification: preservation of lean body mass, repletion     Previous Goals:   Patient will consume >/=75% meals TID plus at least 1 supplement/day  Evaluation: met on average    Previous Nutrition Diagnosis:   Malnutrition related to suspected sub-optimal calorie/protein intake 2/2 progression with " Parkinsons and possible dysphagia as evidenced by fat and muscle depletion and intakes likely </=75% needs   Evaluation: No change    CURRENT NUTRITION DIAGNOSIS  Malnutrition related to suspected sub-optimal calorie/protein intake 2/2 progression with Parkinsons and possible dysphagia as evidenced by fat and muscle depletion and intakes likely </=75% needs with improvement over the past 4 days    INTERVENTIONS  Recommendations / Nutrition Prescription  Diet per SLP  Encourage meals TID +2 snacks with adequate protein intake  Precious [employer] supplements    Implementation  None new today    Goals  Patient will consume >/=75% meals TID plus at least 1 supplement/day    MONITORING AND EVALUATION:  Progress towards goals will be monitored and evaluated per protocol and Practice Guidelines    Kami Seo RD, LD  [facility], [facility], [facility] Sp[facility]  Pager: [phone]  Weekend Pager: [phone]

## 2021-10-29 NOTE — PROGRESS NOTES
Care Management Follow Up    Length of Stay (days): 7    Expected Discharge Date: 10/29/2021     Concerns to be Addressed: discharge planning     Patient plan of care discussed at interdisciplinary rounds: Yes    Anticipated Discharge Disposition: Home Care     Anticipated Discharge Services:    Anticipated Discharge DME: Walker    Patient/family educated on Medicare website which has current facility and service quality ratings: yes  Education Provided on the Discharge Plan:    Patient/Family in Agreement with the Plan: yes    Referrals Placed by CM/SW:    Private pay costs discussed: transportation costs    Additional Information:  [employer] wheelchair transport arranged per spouse request.  Ride is set for 5 pm 10/29 pending discharge orders.      Virginia Solis RN Care Coordinator

## 2021-10-29 NOTE — PLAN OF CARE
A&Ox4. Drowsy. Agitated when turning. VSS. Denies pain. Unable to get OOB with assistance, utilizing lift. Incontinent. Turn/repo q 2hrs. Tolerating fair amount of fair diet. Plan to discharge home this evening.

## 2021-11-01 NOTE — PLAN OF CARE
Physical Therapy Discharge Summary    Reason for therapy discharge:    Discharged to home with home therapy.    Progress towards therapy goal(s). See goals on Care Plan in [facility] electronic health record for goal details.  Goals not met.  Barriers to achieving goals:   limited tolerance for therapy and discharge from facility.    Therapy recommendation(s):    Continued therapy is recommended.  Rationale/Recommendations:  Recommend total assist with cares, home PT.